# Patient Record
Sex: MALE | Race: WHITE | Employment: OTHER | URBAN - METROPOLITAN AREA
[De-identification: names, ages, dates, MRNs, and addresses within clinical notes are randomized per-mention and may not be internally consistent; named-entity substitution may affect disease eponyms.]

---

## 2017-04-05 ENCOUNTER — TELEPHONE (OUTPATIENT)
Dept: CARDIOLOGY | Facility: CLINIC | Age: 62
End: 2017-04-05

## 2017-04-05 NOTE — TELEPHONE ENCOUNTER
Writer called pt daughter Tyra to inquire more information prior to OV with Dr. Flanagan. Pt daughter states that pt is not short of breath or having any chest pain at this time. Pt daughter states that pt becomes dizzy, pale, diaphoretic, and experiences palpitations that last 20-30 minutes and these symptoms occur about once per day. Pt daughter states that these symptoms have been occurring daily for the past week and pt had to leave work early today.  Pt daughter states that these symptoms used to occur about once per month. Pt daughter states that pt has no been seen by a cardiologist since living in [place] for the past 12 years. 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 Pt daughter also stated that pt possibly had a cardioversion done in [place]. Pt daughter was unsure of procedure and pt daughter stated that a pacemaker was recommended, but pt did not get it. Writer recommended that pt go to the ED now prior to appt with Dr. Flanagan. Pt daughter states that pt is stubborn and will not go to the ED at this time. Writer reinforced the recommendation of going to the ED. Pt daughter verbalized understanding, but states pt will probably not agree to go to ED. Pt daughter states that she was receiving a call from her mother and that she would call writer back with more information. Writer will await pt daughter return phone call.

## 2017-04-05 NOTE — TELEPHONE ENCOUNTER
Pt's daughter, Tyra, called requesting an appt for her father. 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 She states that he has not seen a cardiologist since moving to  [place] twelve years ago. He does not have a PMD. He has recently been experiencing  Some dizzy spells, during which time he has to sit down & then after a few minutes he is fine. Daughter will contact her mother to see is she has kept any records from [place] to bring to the appt. She does not remember the name of the MD or clinic for us to retrieve recs. Pt was scheduled to see Dr Flanagan tomorrow, 4/6/17.  Mihaela

## 2017-04-05 NOTE — TELEPHONE ENCOUNTER
Pt daughter called back and stated that pt was seen at [facility] for an ablation. Pt daughter states that she is going to bring the records with to the patients appt on 4/6/17. Writer will request records.

## 2017-04-06 ENCOUNTER — OFFICE VISIT (OUTPATIENT)
Dept: CARDIOLOGY | Facility: CLINIC | Age: 62
End: 2017-04-06
Payer: COMMERCIAL

## 2017-04-06 VITALS
SYSTOLIC BLOOD PRESSURE: 138 MMHG | HEIGHT: 72 IN | WEIGHT: 196 LBS | DIASTOLIC BLOOD PRESSURE: 85 MMHG | HEART RATE: 80 BPM | BODY MASS INDEX: 26.55 KG/M2

## 2017-04-06 DIAGNOSIS — Z98.890 S/P ABLATION OF ACCESSORY BYPASS TRACT: ICD-10-CM

## 2017-04-06 DIAGNOSIS — I45.6 ANOMALOUS ATRIOVENTRICULAR EXCITATION: ICD-10-CM

## 2017-04-06 DIAGNOSIS — R00.2 PALPITATIONS: Primary | ICD-10-CM

## 2017-04-06 PROCEDURE — 93000 ELECTROCARDIOGRAM COMPLETE: CPT | Performed by: INTERNAL MEDICINE

## 2017-04-06 PROCEDURE — 99204 OFFICE O/P NEW MOD 45 MIN: CPT | Mod: 25 | Performed by: INTERNAL MEDICINE

## 2017-04-06 NOTE — PROGRESS NOTES
HPI and Plan:   See dictation:[id number]    Orders Placed This Encounter   Procedures     EKG 12-lead complete w/read - Clinics (performed today)     EKG 12-lead complete w/read - Clinics (performed today)     Holter Monitor 48 hour - Adult       No orders of the defined types were placed in this encounter.      There are no discontinued medications.      Encounter Diagnosis   Name Primary?     Palpitations Yes       CURRENT MEDICATIONS:  No current outpatient prescriptions on file.       ALLERGIES   No Known Allergies    PAST MEDICAL HISTORY:  History reviewed. No pertinent past medical history.    PAST SURGICAL HISTORY:  History reviewed. No pertinent surgical history.    FAMILY HISTORY:  Family History   Problem Relation Age of Onset     CANCER Mother      Angina Father        SOCIAL HISTORY:  Social History     Social History     Marital status:      Spouse name: N/A     Number of children: N/A     Years of education: N/A     Social History Main Topics     Smoking status: Never Smoker     Smokeless tobacco: None     Alcohol use None     Drug use: No     Sexual activity: Not Asked     Other Topics Concern     Parent/Sibling W/ Cabg, Mi Or Angioplasty Before 65f 55m? No     Social History Narrative     None       Review of Systems:  Skin:  Negative       Eyes:  Negative      ENT:  Negative      Respiratory:  Negative       Cardiovascular:    Positive for;palpitations;lightheadedness    Gastroenterology: Negative      Genitourinary:  not assessed      Musculoskeletal:  Negative      Neurologic:  Positive for headaches;local weakness    Psychiatric:  Negative      Heme/Lymph/Imm:  Positive for allergies    Endocrine:  Negative        Physical Exam:  Vitals: /85 (BP Location: Right arm, Cuff Size: Adult Large)  Pulse 80  Ht 1.829 m (6')  Wt 88.9 kg (196 lb)  BMI 26.58 kg/m2    Constitutional:  cooperative, alert and oriented, well developed, well nourished, in no acute distress        Skin:  warm and dry  to the touch, no apparent skin lesions or masses noted        Head:  normocephalic, no masses or lesions        Eyes:  pupils equal and round, conjunctivae and lids unremarkable, sclera white, no xanthalasma, EOMS intact, no nystagmus        ENT:  no pallor or cyanosis, dentition good        Neck:  carotid pulses are full and equal bilaterally, JVP normal, no carotid bruit, no thyromegaly        Chest:  normal breath sounds, clear to auscultation, normal A-P diameter, normal symmetry, normal respiratory excursion, no use of accessory muscles          Cardiac: regular rhythm, normal S1/S2, no S3 or S4, apical impulse not displaced, no murmurs, gallops or rubs             variably split S1    Abdomen:  abdomen soft, non-tender, BS normoactive, no mass, no HSM, no bruits        Vascular: pulses full and equal, no bruits auscultated                                        Extremities and Back:  no deformities, clubbing, cyanosis, erythema observed;no edema              Neurological:  affect appropriate, oriented to time, person and place;no gross motor deficits              CC  No referring provider defined for this encounter.

## 2017-04-06 NOTE — MR AVS SNAPSHOT
"              After Visit Summary   4/6/2017    Robert Felix    MRN: [id number]           Patient Information     Date Of Birth          1955        Visit Information        Provider Department      4/6/2017 2:45 PM Pascual Flanagan MD [facility] [place]        Today's Diagnoses     Palpitations    -  1       Follow-ups after your visit        Future tests that were ordered for you today     Open Future Orders        Priority Expected Expires Ordered    Holter Monitor 48 hour - Adult Routine 4/13/2017 4/6/2018 4/6/2017            Who to contact     If you have questions or need follow up information about today's clinic visit or your schedule please contact [facility] directly at [phone].  Normal or non-critical lab and imaging results will be communicated to you by MyChart, letter or phone within 4 business days after the clinic has received the results. If you do not hear from us within 7 days, please contact the clinic through MyChart or phone. If you have a critical or abnormal lab result, we will notify you by phone as soon as possible.  Submit refill requests through [employer] or call your pharmacy and they will forward the refill request to us. Please allow 3 business days for your refill to be completed.          Additional Information About Your Visit        My[employer]Point[employer] Information     [employer] lets you send messages to your doctor, view your test results, renew your prescriptions, schedule appointments and more. To sign up, go to [email or web address] . Click on \"Log in\" on the left side of the screen, which will take you to the Welcome page. Then click on \"Sign up Now\" on the right side of the page.     You will be asked to enter the access code listed below, as well as some personal information. Please follow the directions to create your username and password.     Your access code is: [email or web address]  Expires: " 2017  3:31 PM     Your access code will  in 90 days. If you need help or a new code, please call your [facility] clinic or [phone].        Care EveryWhere ID     This is your Care EveryWhere ID. This could be used by other organizations to access your [facility] medical records  [id number]        Your Vitals Were     Pulse Height BMI (Body Mass Index)             80 1.829 m (6') 26.58 kg/m2          Blood Pressure from Last 3 Encounters:   17 138/85    Weight from Last 3 Encounters:   17 88.9 kg (196 lb)              We Performed the Following     EKG 12-lead complete w/read - Clinics (performed today)     EKG 12-lead complete w/read - Clinics (performed today)        Primary Care Provider    None Specified       No primary provider on file.        Thank you!     Thank you for choosing [facility] [place]  for your care. Our goal is always to provide you with excellent care. Hearing back from our patients is one way we can continue to improve our services. Please take a few minutes to complete the written survey that you may receive in the mail after your visit with us. Thank you!             Your Updated Medication List - Protect others around you: Learn how to safely use, store and throw away your medicines at www.disposemymeds.org.      Notice  As of 2017  3:31 PM    You have not been prescribed any medications.

## 2017-04-07 ENCOUNTER — HOSPITAL ENCOUNTER (OUTPATIENT)
Dept: CARDIOLOGY | Facility: CLINIC | Age: 62
Discharge: HOME | End: 2017-04-07
Attending: INTERNAL MEDICINE | Admitting: INTERNAL MEDICINE
Payer: COMMERCIAL

## 2017-04-07 DIAGNOSIS — R00.2 PALPITATIONS: ICD-10-CM

## 2017-04-07 PROCEDURE — 93227 XTRNL ECG REC<48 HR R&I: CPT | Performed by: INTERNAL MEDICINE

## 2017-04-07 PROCEDURE — 93226 XTRNL ECG REC<48 HR SCAN A/R: CPT

## 2017-04-07 NOTE — PROGRESS NOTES
HISTORY OF PRESENT ILLNESS:  I had the pleasure of seeing Mr. Robert Felix at [facility] for evaluation of palpitations.  This patient previously lived in [place] and was diagnosed with Brian-Parkinson-White syndrome.  He underwent ablation for that condition in 1992.  Before that, his arrhythmias would cause shortness of breath and diaphoresis with tachycardia.  Over the last 4-5 years he has had intermittent palpitations where his heart may feel dull and irregular.  Oftentimes it is not fast.  When he has his palpitations he feels weak.  His speech feels slower.  This may last 20 minutes to 3 hours.  While living in [place], he felt that the palpitations were more frequent and was due to the hot weather.  He has since moved to [place].  Over the last year he has had intermittent palpitations possibly once to twice per week as of last summer.  Now over the last several months it has become more frequent and in the last week it is daily.  It seems to be exacerbated postprandially.  He feels as though his rhythm is not steady but not fast.  He denies syncope, presyncope or lightheadedness.  He does feel a pressure in his chest and head when this occurs.  He has no history of coronary artery disease and denies any exertional angina.  He denies significant shortness of breath with his symptoms.  He denies a history of myocardial infarction or hypertension.  He believes while living in [facility] in 1982 that an EKG was read as possibly having a previous myocardial infarction.  His family history includes father with chest pain that was never evaluated.  The patient is originally from the [facility] and currently is employed doing electrical assembly.  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  The patient denies nausea or vomiting.  He does not exercise routinely but stays very busy.  He is  and his daughter is in attendance today doing some  translation.  He is a nonsmoker.  He does not drink alcohol for U[place] reasons.      ALLERGIES:  None known about.      PAST MEDICAL HISTORY:   1.  Status post inguinal herniorrhaphy in 1992.     2.  Brian-Parkinson-White disease with previous ablation in 1992.   3.  No history of peptic ulcer disease, cancer, tuberculosis, kidney stones or disease, hyper or hypothyroidism.      REVIEW OF SYSTEMS:  A 12-point review of system is performed with pertinent positives and negatives as listed in the HPI.  All other review of systems are asked and are negative.      PHYSICAL EXAMINATION:   VITAL SIGNS:  Current blood pressure is 138/85, pulse is 80 and regular, weight is 196 pounds.   HEENT:  Benign without xanthelasma.   NECK:  Supple without thyromegaly.  Carotid upstroke is brisk without bruits.   CHEST:  Clear to auscultation without wheezes, rales or rhonchi.   CARDIAC:  Regular rate and rhythm.  S1 is variably split based on respiration, normal S2 without S3 or S4 gallop.  No murmur.  No heave, rub or thrill.  No JVD or HJR.  Pulses are all intact without bruits.  No tenderness to palpation across the precordium.   ABDOMEN:  Soft, nontender without organomegaly.   EXTREMITIES:  Without cyanosis, clubbing or edema.      EKG demonstrates normal sinus rhythm at 84 beats per minute.  No significant arrhythmia.  This is a normal EKG.  No delta waves or decrease in the MD interval.      ASSESSMENT:   1.  Robert Felix is a pleasant 61-year-old male originally from the [facility] who presents for evaluation of palpitations.  Etiology is unclear.  I have sketched a picture of the electrical system in the heart including atrial fibrillation and atrial flutter.  We also talked about ventricular tachyarrhythmias.  He is unlikely to have ventricular arrhythmias based on normal EKG and no history of cardiomyopathy or infarction.  On the other hand his symptoms are occurring every day and may represent tachy or  bradyarrhythmias.  It would be prudent for us to evaluate this with a 48-hour Holter monitor.  The rhythms are happening frequently enough where I believe we will identify any arrhythmia during this timeframe.  Further evaluation will depend on the results of that test.  Perhaps an echocardiogram and thyroid function tests are in order.  We will wait until we have these results.   2.  History of Brian-Parkinson-White syndrome with previous ablation.  His palpitations and possible arrhythmia likely have nothing to do with this.  The patient himself notes a different feeling than he had previously with his tachycardias in .  The patient has numerous episodes of his palpitations during the day including frequently postprandially.  This can last up to 3 hours.  Further evaluation as above.      It is my pleasure to assist in the care of Robert Felix.  All his questions were answered to his satisfaction.  Followup appointment will be made based on the results of his monitor.      MD HUGH Deal MD, [facility]             D: 2017 15:47   T: 2017 07:17   MT: KAMALA      Name:     ROBERT FELIX   MRN:      [id number]        Account:      [id number]   :      1955           Service Date: 2017      Document: [id number]

## 2017-04-12 ENCOUNTER — TELEPHONE (OUTPATIENT)
Dept: CARDIOLOGY | Facility: CLINIC | Age: 62
End: 2017-04-12

## 2017-04-12 NOTE — TELEPHONE ENCOUNTER
Received a vm from pt daughter stating that pt wore a holter over the weekend and pt daughter was wondering when the results would be available.     Writer called pt daughter back and stated that Dr. Flanagan has not reviewed the results yet at this time. Pt daughter was wondering if a follow up appt needed to be made. Writer informed pt daughter that once we have Dr. Flanagan response and recommendations pt daughter will be contacted. Pt daughter verbalized understanding and has no further questions or concerns at this time.    Chart reviewed: OV 4/6/17 Dr. Flanagan    ASSESSMENT:   1. Robert Felix is a pleasant 61-year-old male originally from the [facility] who presents for evaluation of palpitations. Etiology is unclear. I have sketched a picture of the electrical system in the heart including atrial fibrillation and atrial flutter. We also talked about ventricular tachyarrhythmias. He is unlikely to have ventricular arrhythmias based on normal EKG and no history of cardiomyopathy or infarction. On the other hand his symptoms are occurring every day and may represent tachy or bradyarrhythmias. It would be prudent for us to evaluate this with a 48-hour Holter monitor. The rhythms are happening frequently enough where I believe we will identify any arrhythmia during this timeframe. Further evaluation will depend on the results of that test. Perhaps an echocardiogram and thyroid function tests are in order. We will wait until we have these results.   2. History of Brian-Parkinson-White syndrome with previous ablation. His palpitations and possible arrhythmia likely have nothing to do with this. The patient himself notes a different feeling than he had previously with his tachycardias in 1992. The patient has numerous episodes of his palpitations during the day including frequently postprandially. This can last up to 3 hours. Further evaluation as above.       It is my pleasure to assist in the care of Robert  Elvira. All his questions were answered to his satisfaction. Followup appointment will be made based on the results of his monitor.     Writer will route to Dr. Flanagan for holter monitor recommendations.

## 2017-04-13 ENCOUNTER — TELEPHONE (OUTPATIENT)
Dept: CARDIOLOGY | Facility: CLINIC | Age: 62
End: 2017-04-13

## 2017-04-13 DIAGNOSIS — R00.2 PALPITATIONS: Primary | ICD-10-CM

## 2017-04-13 NOTE — TELEPHONE ENCOUNTER
The Holter Monitor shows paroxysmal atrial fibrillation.  We need a TSH and stress echo (to assess for cardiac ischemia and look at chamber sizes and valves).  After this is completed, I would like to see this patient in our office with an office visit to discuss treatment options and decide if anticoagulation is desired.  I would wait to start beta blockers or other medications until the stress echo is completed.  Pascual Flanagan MD, [facility]   April 12, 2017 8:31 PM     Writer placed order for stress ECHO, TSH, and follow up with Dr. Flanagan    Writer called pt daughter back with results as she called on 4/12/17 asking for results. Pt daughter stated that writer should speak with her about results. Pt daughter stated that if writer called pt to discuss pt daughter would need to translate. Writer told pt daughter that pt is currently in A-fib Writer told pt daughter that Dr. Flanagan advised that pt get a TSH and stress ECHO and Dr. Flanagan would like to see pt back in the office. Pt daughter verbalized understanding. No further questions or concerns at this time. Writer tx pt daughter to scheduling.

## 2017-04-17 ENCOUNTER — TRANSFERRED RECORDS (OUTPATIENT)
Dept: HEALTH INFORMATION MANAGEMENT | Facility: CLINIC | Age: 62
End: 2017-04-17

## 2017-04-20 ENCOUNTER — HOSPITAL ENCOUNTER (OUTPATIENT)
Dept: CARDIOLOGY | Facility: CLINIC | Age: 62
Discharge: HOME | End: 2017-04-20
Attending: INTERNAL MEDICINE | Admitting: INTERNAL MEDICINE
Payer: COMMERCIAL

## 2017-04-20 DIAGNOSIS — R00.2 PALPITATIONS: ICD-10-CM

## 2017-04-20 LAB — TSH SERPL DL<=0.05 MIU/L-ACNC: 1.59 MU/L (ref 0.4–4)

## 2017-04-20 PROCEDURE — 84443 ASSAY THYROID STIM HORMONE: CPT | Performed by: INTERNAL MEDICINE

## 2017-04-20 PROCEDURE — 93350 STRESS TTE ONLY: CPT | Mod: 26 | Performed by: INTERNAL MEDICINE

## 2017-04-20 PROCEDURE — 36415 COLL VENOUS BLD VENIPUNCTURE: CPT | Performed by: INTERNAL MEDICINE

## 2017-04-20 PROCEDURE — 93325 DOPPLER ECHO COLOR FLOW MAPG: CPT | Mod: 26 | Performed by: INTERNAL MEDICINE

## 2017-04-20 PROCEDURE — 93321 DOPPLER ECHO F-UP/LMTD STD: CPT | Mod: 26 | Performed by: INTERNAL MEDICINE

## 2017-04-20 PROCEDURE — 93018 CV STRESS TEST I&R ONLY: CPT | Performed by: INTERNAL MEDICINE

## 2017-04-20 PROCEDURE — 93016 CV STRESS TEST SUPVJ ONLY: CPT | Performed by: INTERNAL MEDICINE

## 2017-04-20 PROCEDURE — 93325 DOPPLER ECHO COLOR FLOW MAPG: CPT | Mod: TC

## 2017-04-24 ENCOUNTER — TELEPHONE (OUTPATIENT)
Dept: CARDIOLOGY | Facility: CLINIC | Age: 62
End: 2017-04-24

## 2017-04-24 DIAGNOSIS — I48.0 PAROXYSMAL ATRIAL FIBRILLATION (H): Primary | ICD-10-CM

## 2017-04-24 RX ORDER — METOPROLOL TARTRATE 25 MG/1
25 TABLET, FILM COATED ORAL 2 TIMES DAILY
Qty: 180 TABLET | Refills: 3 | Status: SHIPPED | OUTPATIENT
Start: 2017-04-24 | End: 2017-12-18

## 2017-04-24 NOTE — TELEPHONE ENCOUNTER
Spoke to patient's daughter. Results given.  Metoprolol 25 mg BID ordered to pharmacy of choice. May 18 office visit with Dr. Flanagan was rescheduled to May 1 to discuss flecainide/sotalol and anticoagulation. Team 4 number given for futher concerns.

## 2017-04-24 NOTE — TELEPHONE ENCOUNTER
Called patient to give him the results of his echo stress test and TSH:    Patient with normal stress test.  Bicuspid aortic valve with mild aortic enlargement.  His family should be evaluated for the same diagnosis.  No aortic stenosis.  He was found to have paroxysmal atrial fibrillation on his Holter.  I would start him on metoprolol 25 mg bid for now with antiarrhythmics like flecainide or sotalol also possibilities.  I would like to meet with him to discuss this more fully and decide regarding anticoagulation.  CHADS-Vasc score is 0.  Pascual Flanagan MD, [facility]   April 20, 2017 11:00 PM     Normal TSH.  Pascual Flanagan MD, FACC     Patient stated he understood but he just had a tooth extracted and is currently on pain medications.  He would like these results and medications to be discussed with his daughter Tyra.     Tyra called. She did not answer. Left a message to call team 4 with the direct number.

## 2017-05-01 ENCOUNTER — OFFICE VISIT (OUTPATIENT)
Dept: CARDIOLOGY | Facility: CLINIC | Age: 62
End: 2017-05-01
Payer: COMMERCIAL

## 2017-05-01 VITALS
DIASTOLIC BLOOD PRESSURE: 75 MMHG | HEART RATE: 60 BPM | SYSTOLIC BLOOD PRESSURE: 130 MMHG | BODY MASS INDEX: 26.82 KG/M2 | WEIGHT: 198 LBS | HEIGHT: 72 IN

## 2017-05-01 DIAGNOSIS — R00.2 PALPITATIONS: ICD-10-CM

## 2017-05-01 DIAGNOSIS — I35.9 AORTIC VALVE DISORDER: ICD-10-CM

## 2017-05-01 DIAGNOSIS — I48.0 PAROXYSMAL ATRIAL FIBRILLATION (H): Primary | ICD-10-CM

## 2017-05-01 DIAGNOSIS — Z98.890 S/P ABLATION OF ACCESSORY BYPASS TRACT: ICD-10-CM

## 2017-05-01 DIAGNOSIS — Q25.43 ANEURYSM OF AORTIC ROOT: ICD-10-CM

## 2017-05-01 PROCEDURE — 99214 OFFICE O/P EST MOD 30 MIN: CPT | Performed by: INTERNAL MEDICINE

## 2017-05-01 NOTE — LETTER
5/1/2017    Pete Pham MD  [facility]   [address]  [facility] [address]    RE: Robert Felix       Dear Colleague,    I again had the pleasure of seeing your patient, Robert Felix, at [facility] for evaluation of palpitations.  The patient has noted a previous diagnosis of Brian-Parkinson-White syndrome with ablation in 1992.  The patient states that prior to the year 2000 he was having palpitations with a regular supraventricular tachycardia.  After the year 2000 he felt that his arrhythmias would be quite irregular and quite fast.  I saw him in clinic on 04/06/2017.  On 04/11, a 48-hour Holter monitor was performed indicating paroxysmal atrial fibrillation with occasional premature ventricular contractions and occasional short bursts of supraventricular tachycardia.  Additionally a stress echocardiogram was performed on 04/20/2017 with the patient able to exercise for 7 minutes free of angina.  There were no areas of ischemia or infarction.  His resting blood pressure was 128/80 with a peak blood pressure of 180/70.  Analysis of his valves from his stress echo showed a bicuspid aortic valve with a raphe between the right and left coronary cusps.  There was trace aortic regurgitation and no hemodynamically significant valvular aortic stenosis.  There was mild aortic root dilatation at 4 cm but normal ascending aorta.  RVSP was 23 mmHg plus right atrial pressure.      The patient states that in the last 2 weeks since Easter he has reduced his intake of chocolate and caffeine.  He has had only 1 episode of palpitations lasting 6 seconds.  He has otherwise felt well.  He continues to be quite active.  He denies any heavy weightlifting.      PHYSICAL EXAMINATION:   VITAL SIGNS:  Current blood pressure is 130/75, pulse is 60 and regular, weight is 198 pounds, BMI is 27.   CHEST:  Clear to auscultation.   CARDIAC:  Regular rate and rhythm, normal  S1 and S2 without S3 or S4 gallop.  No murmur.  No heave, rub or thrill.  No JVD or HJR.  Pulses are all intact without bruits.  No tenderness to palpation across the precordium.   ABDOMEN:  Soft, nontender without organomegaly.   EXTREMITIES:  Without cyanosis, clubbing or edema.     Outpatient Encounter Prescriptions as of 5/1/2017   Medication Sig Dispense Refill     metoprolol (LOPRESSOR) 25 MG tablet Take 1 tablet (25 mg) by mouth 2 times daily (Patient taking differently: Take 25 mg by mouth 2 times daily Has not started yet.) 180 tablet 3     No facility-administered encounter medications on file as of 5/1/2017.       ASSESSMENT:   1.  Robert Felix is a pleasant 61-year-old male originally from the [facility] who presents for evaluation of palpitations.  The patient has some supraventricular arrhythmias including paroxysmal atrial fibrillation.  These occur sporadically.  Initially we thought placing him on low dose beta blocker would be helpful but his heart rate at rest can be as low as 60 beats per minute.  This does not allow much room for the addition of a beta blocker.  If this patient has frequent episodes of SVT including atrial fibrillation antiarrhythmic medications such as flecainide might be preferred.  This has been explained to the patient at length.  For now he is happy with the infrequent rhythms that he is having.  He would like to avoid medications if possible.  We will see him again in 6 months to again discuss his arrhythmias.   2.  History of Brian-Parkinson-White syndrome with previous ablation.  Atrial tachyarrhythmias would be a bigger problem had he not had a WPW ablation.  For now we can continue to monitor since there is no obvious indication that WPW still exists in this patient.   3.  The patient has evidence of a bicuspid aortic valve and mild aortic root enlargement.  We have discussed this at length including the genetic risk to his family.  I have suggested that children  and siblings should be evaluated for bicuspid aortic valve or aortic aneurysms.  For now annual echocardiographic surveillance would seem prudent.  The patient is agreeable and we will recheck an echocardiogram in 1 year.  No SBE prophylaxis is necessary.  I have asked the patient to continue with his normal life but avoid prolonged heavy lifting.      It is my pleasure to assist in the care of Robert Felix.  His daughter was in attendance and did translation today.  All the patient's questions were answered to his satisfaction.      Thirty-five minutes were spent with the patient and his daughter, greater than 50 percent of that was counseling.     Sincerely,    Pascual Flanagan MD     [facility]

## 2017-05-01 NOTE — MR AVS SNAPSHOT
"              After Visit Summary   5/1/2017    Robert Felix    MRN: [id number]           Patient Information     Date Of Birth          1955        Visit Information        Provider Department      5/1/2017 3:45 PM Pascual Flanagan MD [facility] [place]        Today's Diagnoses     Paroxysmal atrial fibrillation (H)    -  1    Aortic valve disorder        Palpitations           Follow-ups after your visit        Additional Services     Follow-Up with Cardiologist                 Future tests that were ordered for you today     Open Future Orders        Priority Expected Expires Ordered    Echocardiogram Routine 5/1/2018 6/5/2018 5/1/2017    Follow-Up with Cardiologist Routine 11/1/2017 9/13/2018 5/1/2017            Who to contact     If you have questions or need follow up information about today's clinic visit or your schedule please contact [facility] [place] directly at [phone].  Normal or non-critical lab and imaging results will be communicated to you by MyChart, letter or phone within 4 business days after the clinic has received the results. If you do not hear from us within 7 days, please contact the clinic through TransitScreen[employer] or phone. If you have a critical or abnormal lab result, we will notify you by phone as soon as possible.  Submit refill requests through [employer] or call your pharmacy and they will forward the refill request to us. Please allow 3 business days for your refill to be completed.          Additional Information About Your Visit        MyChart Information     [employer] lets you send messages to your doctor, view your test results, renew your prescriptions, schedule appointments and more. To sign up, go to [email or web address] . Click on \"Log in\" on the left side of the screen, which will take you to the Welcome page. Then click on \"Sign up Now\" on the right side of the page.     You will be asked to enter " the access code listed below, as well as some personal information. Please follow the directions to create your username and password.     Your access code is: [email or web address]  Expires: 2017  3:31 PM     Your access code will  in 90 days. If you need help or a new code, please call your [place] clinic or [phone].        Care EveryWhere ID     This is your Care EveryWhere ID. This could be used by other organizations to access your [place] medical records  [id number]        Your Vitals Were     Pulse Height BMI (Body Mass Index)             60 1.829 m (6') 26.85 kg/m2          Blood Pressure from Last 3 Encounters:   17 130/75   17 138/85    Weight from Last 3 Encounters:   17 89.8 kg (198 lb)   17 88.9 kg (196 lb)              We Performed the Following     Follow-Up with Cardiologist          Today's Medication Changes          These changes are accurate as of: 17  4:37 PM.  If you have any questions, ask your nurse or doctor.               These medicines have changed or have updated prescriptions.        Dose/Directions    metoprolol 25 MG tablet   Commonly known as:  LOPRESSOR   This may have changed:  additional instructions   Used for:  Paroxysmal atrial fibrillation (H)        Dose:  25 mg   Take 1 tablet (25 mg) by mouth 2 times daily   Quantity:  180 tablet   Refills:  3                Primary Care Provider Office Phone # Fax #    Pete Pham [phone]       [facility] [address]  [facility] [address]        Thank you!     Thank you for choosing [facility] [place]  for your care. Our goal is always to provide you with excellent care. Hearing back from our patients is one way we can continue to improve our services. Please take a few minutes to complete the written survey that you may receive in the mail after your visit with us. Thank you!             Your Updated Medication  List - Protect others around you: Learn how to safely use, store and throw away your medicines at www.disposemymeds.org.          This list is accurate as of: 5/1/17  4:37 PM.  Always use your most recent med list.                   Brand Name Dispense Instructions for use    metoprolol 25 MG tablet    LOPRESSOR    180 tablet    Take 1 tablet (25 mg) by mouth 2 times daily

## 2017-05-01 NOTE — PROGRESS NOTES
HPI and Plan:   See dictation:[id number]    Orders Placed This Encounter   Procedures     Follow-Up with Cardiologist     Echocardiogram       No orders of the defined types were placed in this encounter.      There are no discontinued medications.      Encounter Diagnoses   Name Primary?     Paroxysmal atrial fibrillation (H) Yes     Aortic valve disorder      Palpitations        CURRENT MEDICATIONS:  Current Outpatient Prescriptions   Medication Sig Dispense Refill     metoprolol (LOPRESSOR) 25 MG tablet Take 1 tablet (25 mg) by mouth 2 times daily (Patient taking differently: Take 25 mg by mouth 2 times daily Has not started yet.) 180 tablet 3       ALLERGIES   No Known Allergies    PAST MEDICAL HISTORY:  History reviewed. No pertinent past medical history.    PAST SURGICAL HISTORY:  History reviewed. No pertinent surgical history.    FAMILY HISTORY:  Family History   Problem Relation Age of Onset     CANCER Mother      Angina Father        SOCIAL HISTORY:  Social History     Social History     Marital status:      Spouse name: N/A     Number of children: N/A     Years of education: N/A     Social History Main Topics     Smoking status: Never Smoker     Smokeless tobacco: None     Alcohol use None     Drug use: No     Sexual activity: Not Asked     Other Topics Concern     Parent/Sibling W/ Cabg, Mi Or Angioplasty Before 65f 55m? No     Social History Narrative       Review of Systems:  Skin:  Negative       Eyes:  Negative      ENT:  Negative      Respiratory:  Negative       Cardiovascular:  Negative      Gastroenterology: Negative      Genitourinary:  not assessed      Musculoskeletal:  Negative      Neurologic:  Negative      Psychiatric:  Negative      Heme/Lymph/Imm:  Positive for allergies    Endocrine:  Negative        Physical Exam:  Vitals: /75 (BP Location: Left arm, Cuff Size: Adult Large)  Pulse 60  Ht 1.829 m (6')  Wt 89.8 kg (198 lb)  BMI 26.85 kg/m2    Constitutional:  cooperative,  alert and oriented, well developed, well nourished, in no acute distress        Skin:  warm and dry to the touch, no apparent skin lesions or masses noted        Head:  normocephalic, no masses or lesions        Eyes:  pupils equal and round, conjunctivae and lids unremarkable, sclera white, no xanthalasma, EOMS intact, no nystagmus        ENT:  no pallor or cyanosis, dentition good        Neck:  carotid pulses are full and equal bilaterally, JVP normal, no carotid bruit, no thyromegaly        Chest:  normal breath sounds, clear to auscultation, normal A-P diameter, normal symmetry, normal respiratory excursion, no use of accessory muscles          Cardiac: regular rhythm, normal S1/S2, no S3 or S4, apical impulse not displaced, no murmurs, gallops or rubs             variably split S1    Abdomen:  abdomen soft, non-tender, BS normoactive, no mass, no HSM, no bruits        Vascular: pulses full and equal, no bruits auscultated                                        Extremities and Back:  no deformities, clubbing, cyanosis, erythema observed;no edema              Neurological:  affect appropriate, oriented to time, person and place;no gross motor deficits              CC  No referring provider defined for this encounter.

## 2017-05-02 NOTE — PROGRESS NOTES
HISTORY OF PRESENT ILLNESS:  I again had the pleasure of seeing your patient, Robert Felix, at [facility] for evaluation of palpitations.  The patient has noted a previous diagnosis of Brian-Parkinson-White syndrome with ablation in 1992.  The patient states that prior to the year 2000 he was having palpitations with a regular supraventricular tachycardia.  After the year 2000 he felt that his arrhythmias would be quite irregular and quite fast.  I saw him in clinic on 04/06/2017.  On 04/11, a 48-hour Holter monitor was performed indicating paroxysmal atrial fibrillation with occasional premature ventricular contractions and occasional short bursts of supraventricular tachycardia.  Additionally a stress echocardiogram was performed on 04/20/2017 with the patient able to exercise for 7 minutes free of angina.  There were no areas of ischemia or infarction.  His resting blood pressure was 128/80 with a peak blood pressure of 180/70.  Analysis of his valves from his stress echo showed a bicuspid aortic valve with a raphe between the right and left coronary cusps.  There was trace aortic regurgitation and no hemodynamically significant valvular aortic stenosis.  There was mild aortic root dilatation at 4 cm but normal ascending aorta.  RVSP was 23 mmHg plus right atrial pressure.      The patient states that in the last 2 weeks since Easter he has reduced his intake of chocolate and caffeine.  He has had only 1 episode of palpitations lasting 6 seconds.  He has otherwise felt well.  He continues to be quite active.  He denies any heavy weightlifting.      PHYSICAL EXAMINATION:   VITAL SIGNS:  Current blood pressure is 130/75, pulse is 60 and regular, weight is 198 pounds, BMI is 27.   CHEST:  Clear to auscultation.   CARDIAC:  Regular rate and rhythm, normal S1 and S2 without S3 or S4 gallop.  No murmur.  No heave, rub or thrill.  No JVD or HJR.  Pulses are all intact without bruits.  No  tenderness to palpation across the precordium.   ABDOMEN:  Soft, nontender without organomegaly.   EXTREMITIES:  Without cyanosis, clubbing or edema.      ASSESSMENT:   1.  Robert Felix is a pleasant 61-year-old male originally from the [facility] who presents for evaluation of palpitations.  The patient has some supraventricular arrhythmias including paroxysmal atrial fibrillation.  These occur sporadically.  Initially we thought placing him on low dose beta blocker would be helpful but his heart rate at rest can be as low as 60 beats per minute.  This does not allow much room for the addition of a beta blocker.  If this patient has frequent episodes of SVT including atrial fibrillation antiarrhythmic medications such as flecainide might be preferred.  This has been explained to the patient at length.  For now he is happy with the infrequent rhythms that he is having.  He would like to avoid medications if possible.  We will see him again in 6 months to again discuss his arrhythmias.   2.  History of Brian-Parkinson-White syndrome with previous ablation.  Atrial tachyarrhythmias would be a bigger problem had he not had a WPW ablation.  For now we can continue to monitor since there is no obvious indication that WPW still exists in this patient.   3.  The patient has evidence of a bicuspid aortic valve and mild aortic root enlargement.  We have discussed this at length including the genetic risk to his family.  I have suggested that children and siblings should be evaluated for bicuspid aortic valve or aortic aneurysms.  For now annual echocardiographic surveillance would seem prudent.  The patient is agreeable and we will recheck an echocardiogram in 1 year.  No SBE prophylaxis is necessary.  I have asked the patient to continue with his normal life but avoid prolonged heavy lifting.      It is my pleasure to assist in the care of Robert Felix.  His daughter was in attendance and did translation today.   All the patient's questions were answered to his satisfaction.      Thirty-five minutes were spent with the patient and his daughter, greater than 50 percent of that was counseling.      Pascual Flanagan MD      cc:   Pete Pham [facility]   [address]   [place] [address]         PASCUAL FLANAGAN MD, [facility]             D: 2017 17:24   T: 2017 07:16   MT: VD      Name:     SVEN MESA   MRN:      [id number]        Account:      [id number]   :      1955           Service Date: 2017      Document: [id number]

## 2017-12-06 PROBLEM — Z86.79 HISTORY OF WOLFF-PARKINSON-WHITE SYNDROME: Status: ACTIVE | Noted: 2017-12-06

## 2017-12-18 ENCOUNTER — OFFICE VISIT (OUTPATIENT)
Dept: CARDIOLOGY | Facility: CLINIC | Age: 62
End: 2017-12-18
Attending: INTERNAL MEDICINE
Payer: COMMERCIAL

## 2017-12-18 VITALS
HEART RATE: 72 BPM | WEIGHT: 192 LBS | SYSTOLIC BLOOD PRESSURE: 132 MMHG | HEIGHT: 72 IN | BODY MASS INDEX: 26.01 KG/M2 | DIASTOLIC BLOOD PRESSURE: 82 MMHG

## 2017-12-18 DIAGNOSIS — Q25.43 ANEURYSM OF AORTIC ROOT: ICD-10-CM

## 2017-12-18 DIAGNOSIS — I45.6 ANOMALOUS ATRIOVENTRICULAR EXCITATION: ICD-10-CM

## 2017-12-18 DIAGNOSIS — I48.0 PAROXYSMAL ATRIAL FIBRILLATION (H): ICD-10-CM

## 2017-12-18 DIAGNOSIS — Z98.890 S/P ABLATION OF ACCESSORY BYPASS TRACT: ICD-10-CM

## 2017-12-18 DIAGNOSIS — R00.2 PALPITATIONS: Primary | ICD-10-CM

## 2017-12-18 DIAGNOSIS — I35.9 AORTIC VALVE DISORDER: ICD-10-CM

## 2017-12-18 PROCEDURE — 99214 OFFICE O/P EST MOD 30 MIN: CPT | Performed by: INTERNAL MEDICINE

## 2017-12-18 RX ORDER — FLECAINIDE ACETATE 50 MG/1
50 TABLET ORAL 2 TIMES DAILY
Qty: 60 TABLET | Refills: 3 | Status: SHIPPED | OUTPATIENT
Start: 2017-12-18 | End: 2018-04-18

## 2017-12-18 RX ORDER — CLOBETASOL PROPIONATE 0.5 MG/G
CREAM TOPICAL
Refills: 3 | COMMUNITY
Start: 2017-04-17

## 2017-12-18 NOTE — MR AVS SNAPSHOT
After Visit Summary   12/18/2017    Robert Felix    MRN: [id number]           Patient Information     Date Of Birth          1955        Visit Information        Provider Department      12/18/2017 10:45 AM Pascual Flanagan MD [facility]        Today's Diagnoses     Palpitations    -  1    Paroxysmal atrial fibrillation (H)        Aortic valve disorder        Anomalous atrioventricular excitation        S/P ablation of accessory bypass tract           Follow-ups after your visit        Additional Services     Follow-Up with Cardiac Advanced Practice Provider                 Future tests that were ordered for you today     Open Future Orders        Priority Expected Expires Ordered    EKG 12-lead complete w/read - Clinics (to be scheduled) Routine 1/17/2018 12/18/2018 12/18/2017    Follow-Up with Cardiac Advanced Practice Provider Routine 1/17/2018 12/18/2018 12/18/2017    Exercise Stress Test (Stress ECG) Routine 12/21/2017 12/18/2018 12/18/2017            Who to contact     If you have questions or need follow up information about today's clinic visit or your schedule please contact [facility] directly at [phone].  Normal or non-critical lab and imaging results will be communicated to you by MyChart, letter or phone within 4 business days after the clinic has received the results. If you do not hear from us within 7 days, please contact the clinic through McPhyhart or phone. If you have a critical or abnormal lab result, we will notify you by phone as soon as possible.  Submit refill requests through [employer] or call your pharmacy and they will forward the refill request to us. Please allow 3 business days for your refill to be completed.          Additional Information About Your Visit        McPhyhart Information     [employer] lets you send messages to your doctor, view your test results, renew your  "prescriptions, schedule appointments and more. To sign up, go to [email or web address] . Click on \"Log in\" on the left side of the screen, which will take you to the Welcome page. Then click on \"Sign up Now\" on the right side of the page.     You will be asked to enter the access code listed below, as well as some personal information. Please follow the directions to create your username and password.     Your access code is: [email or web address]  Expires: 3/18/2018 12:00 PM     Your access code will  in 90 days. If you need help or a new code, please call your [place] clinic or [phone].        Care EveryWhere ID     This is your Care EveryWhere ID. This could be used by other organizations to access your [place] medical records  [id number]        Your Vitals Were     Pulse Height BMI (Body Mass Index)             72 1.829 m (6') 26.04 kg/m2          Blood Pressure from Last 3 Encounters:   17 132/82   17 130/75   17 138/85    Weight from Last 3 Encounters:   17 87.1 kg (192 lb)   17 89.8 kg (198 lb)   17 88.9 kg (196 lb)              We Performed the Following     Follow-Up with Cardiologist          Today's Medication Changes          These changes are accurate as of: 17 12:00 PM.  If you have any questions, ask your nurse or doctor.               Start taking these medicines.        Dose/Directions    flecainide 50 MG tablet   Commonly known as:  TAMBOCOR   Used for:  Paroxysmal atrial fibrillation (H)   Started by:  Pascual Flanagan MD        Dose:  50 mg   Take 1 tablet (50 mg) by mouth 2 times daily   Quantity:  60 tablet   Refills:  3            Where to get your medicines      These medications were sent to [facility]/pharmacy #[id number] - [facility] [address] [place] [address]     Phone:  [phone]     flecainide 50 MG tablet                Primary Care Provider Office Phone # Fax #    Pete Pham [phone] " [phone]       [facility] [address]   [facility] [address]        Equal Access to Services     VINCENT HAM : Hadii aad ku hadnavjoto Soneeta, waaxda luqadaha, qaybta kaalmada ger, ynes arellano laSolrahul marques. So [facility] [phone].    ATENCIÓN: Si habla español, tiene a barcenas disposición servicios gratuitos de asistencia lingüística. Llame al [phone].    We comply with applicable federal civil rights laws and [place] laws. We do not discriminate on the basis of race, color, national origin, age, disability, sex, sexual orientation, or gender identity.            Thank you!     Thank you for choosing [facility]  for your care. Our goal is always to provide you with excellent care. Hearing back from our patients is one way we can continue to improve our services. Please take a few minutes to complete the written survey that you may receive in the mail after your visit with us. Thank you!             Your Updated Medication List - Protect others around you: Learn how to safely use, store and throw away your medicines at www.disposemymeds.org.          This list is accurate as of: 12/18/17 12:00 PM.  Always use your most recent med list.                   Brand Name Dispense Instructions for use Diagnosis    clobetasol 0.05 % cream    TEMOVATE     APPLY 1 APPLICATION TO SKIN ONCE A DAY AS NEEDED (HAND DERMATITIS).        flecainide 50 MG tablet    TAMBOCOR    60 tablet    Take 1 tablet (50 mg) by mouth 2 times daily    Paroxysmal atrial fibrillation (H)

## 2017-12-18 NOTE — LETTER
12/18/2017      Pete Pham MD  [facility] [address]  [facility] [place] [address]      RE: Robert Felix       Dear Colleague,    I had the pleasure of seeing Robert Felix in the [facility].    PRIMARY CARE PHYSICIAN:  Pete Pham DO      HISTORY OF PRESENT ILLNESS:  I again had the pleasure of seeing your patient, Robert Felix, at [facility] for evaluation of palpitations and paroxysmal atrial fibrillation.  The patient has noted a previous diagnosis of Brian-Parkinson-White syndrome with ablation in 1992.  The patient speaks Icelandic and his daughter acts as the .  He states that prior to the year 2000 he was having palpitations with a regular supraventricular tachycardia.  After the year 2000, he felt that his arrhythmias would be quite irregular and at times fast.  A 48-hour Holter monitor performed on 04/11 indicated paroxysmal atrial fibrillation with occasional premature ventricular contractions and occasional short bursts of supraventricular tachycardia.  A stress echocardiogram performed 04/20/2017 demonstrated the patient exercising 7 minutes free of angina.  There were no areas of ischemia or infarction.  His resting blood pressure was 128/80 with a peak blood pressure of 180/70.  Analysis of his valves from a stress echo showed a bicuspid aortic valve with a raphe between the right and left coronary cusps.  There was only trace aortic insufficiency and no hemodynamically significant valvular aortic stenosis.  The aortic root was dilated to 4 cm but normal ascending aorta.  RVSP was 23 mmHg plus right atrial pressure.  The patient continues to have palpitations.  He notes after he eats, frequent episodes where his heart is irregular and at times not fast.  His daughter notes that he loses color in his face and the patient develops pressure in the chest.  He does not do any formal exercise.   He denies significant shortness of breath.  He denies syncope or presyncope.  He notes October was a good month for arrhythmias but in the last month, he has had significant palpitations on a daily basis.  He has been unable to find triggers for his arrhythmias.  He thinks it may be due to food or drinking fruit juices.  He notes that when it occurs at night, he urinates more frequently.      PHYSICAL EXAMINATION:  As below.      ASSESSMENT:   1.  Robert Felix is a pleasant 62-year-old male originally from the [facility] who presents for ongoing palpitations with evidence of supraventricular arrhythmias including paroxysmal atrial fibrillation.  His CHADS-VASc score is 0.  His heart rate is slow enough where I do not believe adding a beta blocker will be very helpful.  We could add a low dose such as metoprolol 12.5 mg b.i.d.  Instead I think it is prudent to consider adding flecainide 50 mg b.i.d.  I have explained to this patient the risks and benefits of this medication.  We tried to avoid medications for 6 months to no avail.  We will perform a treadmill stress test after 5 doses of this medicine.  If there are no proarrhythmias, I will have my KARO see him at 30 days and decide whether he has had some symptom relief.  If this dose is effective, then we will perform a 2-week ZIO Patch to assess his rhythms.  If, in fact, he continues to complain of significant palpitations, we  will move up to 100 mg b.i.d. and then see him 1 month later.  We will perform EKGs at both outpatient visits.  A ZIO Patch would be performed at the higher dose as well.  A stress test would have to be performed at the higher dose after 3 days as well.  I have asked this patient to call my office for any side effects or ongoing severe palpitations.   2.  History of Brian-Parkinson-White syndrome with previous ablation.  His EKG does not show a delta wave.  We will monitor him for any evidence of preexcitation.   3.  The patient has  evidence of a bicuspid aortic valve and mild aortic root enlargement.  We will plan another echocardiogram in 04/2018.  No SBE prophylaxis is necessary.      It is my pleasure to assist in the care of Robert Felix.  Our plan is to try to allow this 62-year-old gentleman to lead as normal life as possible without too many restrictions.  Will see how he does over the next 30-60 days on the flecainide.  All the patient's questions were answered to his satisfaction.      Thirty-five minutes were spent with the patient and daughter.  Greater than 50% of that time was counseling.      Outpatient Encounter Prescriptions as of 12/18/2017   Medication Sig Dispense Refill     clobetasol (TEMOVATE) 0.05 % cream APPLY 1 APPLICATION TO SKIN ONCE A DAY AS NEEDED (HAND DERMATITIS).  3     flecainide (TAMBOCOR) 50 MG tablet Take 1 tablet (50 mg) by mouth 2 times daily 60 tablet 3     [DISCONTINUED] metoprolol (LOPRESSOR) 25 MG tablet Take 1 tablet (25 mg) by mouth 2 times daily (Patient taking differently: Take 25 mg by mouth 2 times daily Has not started yet.) 180 tablet 3     No facility-administered encounter medications on file as of 12/18/2017.      Again, thank you for allowing me to participate in the care of your patient.      Sincerely,    Pascual Flanagan MD     [facility]

## 2017-12-18 NOTE — PROGRESS NOTES
PRIMARY CARE PHYSICIAN:  Pete Pham DO      HISTORY OF PRESENT ILLNESS:  I again had the pleasure of seeing your patient, Robert Felix, at [facility] for evaluation of palpitations and paroxysmal atrial fibrillation.  The patient has noted a previous diagnosis of Brian-Parkinson-White syndrome with ablation in 1992.  The patient speaks Spanish and his daughter acts as the .  He states that prior to the year 2000 he was having palpitations with a regular supraventricular tachycardia.  After the year 2000, he felt that his arrhythmias would be quite irregular and at times fast.  A 48-hour Holter monitor performed on 04/11 indicated paroxysmal atrial fibrillation with occasional premature ventricular contractions and occasional short bursts of supraventricular tachycardia.  A stress echocardiogram performed 04/20/2017 demonstrated the patient exercising 7 minutes free of angina.  There were no areas of ischemia or infarction.  His resting blood pressure was 128/80 with a peak blood pressure of 180/70.  Analysis of his valves from a stress echo showed a bicuspid aortic valve with a raphe between the right and left coronary cusps.  There was only trace aortic insufficiency and no hemodynamically significant valvular aortic stenosis.  The aortic root was dilated to 4 cm but normal ascending aorta.  RVSP was 23 mmHg plus right atrial pressure.  The patient continues to have palpitations.  He notes after he eats, frequent episodes where his heart is irregular and at times not fast.  His daughter notes that he loses color in his face and the patient develops pressure in the chest.  He does not do any formal exercise.  He denies significant shortness of breath.  He denies syncope or presyncope.  He notes October was a good month for arrhythmias but in the last month, he has had significant palpitations on a daily basis.  He has been unable to find triggers for his arrhythmias.  He  thinks it may be due to food or drinking fruit juices.  He notes that when it occurs at night, he urinates more frequently.      PHYSICAL EXAMINATION:  As below.      ASSESSMENT:   1.  Robert Felix is a pleasant 62-year-old male originally from the [facility] who presents for ongoing palpitations with evidence of supraventricular arrhythmias including paroxysmal atrial fibrillation.  His CHADS-VASc score is 0.  His heart rate is slow enough where I do not believe adding a beta blocker will be very helpful.  We could add a low dose such as metoprolol 12.5 mg b.i.d.  Instead I think it is prudent to consider adding flecainide 50 mg b.i.d.  I have explained to this patient the risks and benefits of this medication.  We tried to avoid medications for 6 months to no avail.  We will perform a treadmill stress test after 5 doses of this medicine.  If there are no proarrhythmias, I will have my KARO see him at 30 days and decide whether he has had some symptom relief.  If this dose is effective, then we will perform a 2-week ZIO Patch to assess his rhythms.  If, in fact, he continues to complain of significant palpitations, we  will move up to 100 mg b.i.d. and then see him 1 month later.  We will perform EKGs at both outpatient visits.  A ZIO Patch would be performed at the higher dose as well.  A stress test would have to be performed at the higher dose after 3 days as well.  I have asked this patient to call my office for any side effects or ongoing severe palpitations.   2.  History of Brian-Parkinson-White syndrome with previous ablation.  His EKG does not show a delta wave.  We will monitor him for any evidence of preexcitation.   3.  The patient has evidence of a bicuspid aortic valve and mild aortic root enlargement.  We will plan another echocardiogram in 04/2018.  No SBE prophylaxis is necessary.      It is my pleasure to assist in the care of Robert Felix.  Our plan is to try to allow this 62-year-old  gentleman to lead as normal life as possible without too many restrictions.  Will see how he does over the next 30-60 days on the flecainide.  All the patient's questions were answered to his satisfaction.      Thirty-five minutes were spent with the patient and daughter.  Greater than 50% of that time was counseling.      Pascual Flanagan MD      cc:   Pete Pham [facility]   [address]   [place] [address]         PASCUAL FLANAGAN MD, [facility]             D: 2017 12:11   T: 2017 16:23   MT: al      Name:     SVEN MESA   MRN:      [id number]        Account:      [id number]   :      1955           Service Date: 2017      Document: [id number]

## 2017-12-18 NOTE — PROGRESS NOTES
HPI and Plan:   See dictation:[id number]    Orders Placed This Encounter   Procedures     Follow-Up with Cardiac Advanced Practice Provider     Exercise Stress Test (Stress ECG)     EKG 12-lead complete w/read - Clinics (to be scheduled)       Orders Placed This Encounter   Medications     clobetasol (TEMOVATE) 0.05 % cream     Sig: APPLY 1 APPLICATION TO SKIN ONCE A DAY AS NEEDED (HAND DERMATITIS).     Refill:  3     flecainide (TAMBOCOR) 50 MG tablet     Sig: Take 1 tablet (50 mg) by mouth 2 times daily     Dispense:  60 tablet     Refill:  3       Medications Discontinued During This Encounter   Medication Reason     metoprolol (LOPRESSOR) 25 MG tablet Not filled/taken by Patient         Encounter Diagnoses   Name Primary?     Palpitations Yes     Paroxysmal atrial fibrillation (H)      Aortic valve disorder      Anomalous atrioventricular excitation      S/P ablation of accessory bypass tract        CURRENT MEDICATIONS:  Current Outpatient Prescriptions   Medication Sig Dispense Refill     clobetasol (TEMOVATE) 0.05 % cream APPLY 1 APPLICATION TO SKIN ONCE A DAY AS NEEDED (HAND DERMATITIS).  3     flecainide (TAMBOCOR) 50 MG tablet Take 1 tablet (50 mg) by mouth 2 times daily 60 tablet 3       ALLERGIES   No Known Allergies    PAST MEDICAL HISTORY:  History reviewed. No pertinent past medical history.    PAST SURGICAL HISTORY:  History reviewed. No pertinent surgical history.    FAMILY HISTORY:  Family History   Problem Relation Age of Onset     CANCER Mother      Angina Father        SOCIAL HISTORY:  Social History     Social History     Marital status:      Spouse name: N/A     Number of children: N/A     Years of education: N/A     Social History Main Topics     Smoking status: Never Smoker     Smokeless tobacco: Never Used     Alcohol use None     Drug use: No     Sexual activity: Not Asked     Other Topics Concern     Parent/Sibling W/ Cabg, Mi Or Angioplasty Before 65f 55m? No     Social History  Narrative       Review of Systems:  Skin:  Negative       Eyes:  Negative      ENT:  Negative      Respiratory:  Negative       Cardiovascular:    Positive for;palpitations;fatigue (increased episodes of atrial fib. )    Gastroenterology: Negative      Genitourinary:  not assessed      Musculoskeletal:  Negative      Neurologic:  Negative      Psychiatric:  Negative      Heme/Lymph/Imm:  Positive for allergies    Endocrine:  Negative        Physical Exam:  Vitals: /82  Pulse 72  Ht 1.829 m (6')  Wt 87.1 kg (192 lb)  BMI 26.04 kg/m2    Constitutional:  cooperative, alert and oriented, well developed, well nourished, in no acute distress        Skin:  warm and dry to the touch, no apparent skin lesions or masses noted          Head:  normocephalic, no masses or lesions        Eyes:  pupils equal and round, conjunctivae and lids unremarkable, sclera white, no xanthalasma, EOMS intact, no nystagmus        Lymph:      ENT:  no pallor or cyanosis, dentition good        Neck:  carotid pulses are full and equal bilaterally, JVP normal, no carotid bruit        Respiratory:  normal breath sounds, clear to auscultation, normal A-P diameter, normal symmetry, normal respiratory excursion, no use of accessory muscles         Cardiac: regular rhythm, normal S1/S2, no S3 or S4, apical impulse not displaced, no murmurs, gallops or rubs             variably split S1  pulses full and equal, no bruits auscultated                                        GI:  abdomen soft, non-tender, BS normoactive, no mass, no HSM, no bruits        Extremities and Muscular Skeletal:  no deformities, clubbing, cyanosis, erythema observed;no edema              Neurological:  no gross motor deficits;affect appropriate        Psych:  Alert and Oriented x 3        CC  Pascual Flanagan MD  [address]  [place] [id number]

## 2017-12-21 ENCOUNTER — HOSPITAL ENCOUNTER (OUTPATIENT)
Dept: CARDIOLOGY | Facility: CLINIC | Age: 62
Discharge: HOME | End: 2017-12-21
Attending: INTERNAL MEDICINE | Admitting: INTERNAL MEDICINE
Payer: COMMERCIAL

## 2017-12-21 DIAGNOSIS — I48.0 PAROXYSMAL ATRIAL FIBRILLATION (H): ICD-10-CM

## 2017-12-21 PROCEDURE — 93017 CV STRESS TEST TRACING ONLY: CPT

## 2017-12-21 PROCEDURE — 93018 CV STRESS TEST I&R ONLY: CPT | Performed by: INTERNAL MEDICINE

## 2017-12-21 PROCEDURE — 93016 CV STRESS TEST SUPVJ ONLY: CPT | Performed by: INTERNAL MEDICINE

## 2017-12-26 ENCOUNTER — TELEPHONE (OUTPATIENT)
Dept: CARDIOLOGY | Facility: CLINIC | Age: 62
End: 2017-12-26

## 2017-12-26 NOTE — TELEPHONE ENCOUNTER
Notes Recorded by Pascual Flanagan MD on 12/21/2017 at 9:55 PM  Normal stress test.  Some PACs.  Continue with the Flecainide and my plan as per the recent OV.  Pascual Flanagan MD, [facility]  December 21, 2017 9:55 PM    OV 12/18/17 with Dr. Flanagan   We will perform a treadmill stress test after 5 doses of this medicine.  If there are no proarrhythmias, I will have my KARO see him at 30 days and decide whether he has had some symptom relief.  If this dose is effective, then we will perform a 2-week ZIO Patch to assess his rhythms.  If, in fact, he continues to complain of significant palpitations, we  will move up to 100 mg b.i.d. and then see him 1 month later.  We will perform EKGs at both outpatient visits.  A ZIO Patch would be performed at the higher dose as well.  A stress test would have to be performed at the higher dose after 3 days as well.  I have asked this patient to call my office for any side effects or ongoing severe palpitations.       Pt scheduled to see Conchis NP 1/29/18     Spoke to patients daughter regarding result above. Pts daughter verbalized understanding and confirmed OV 1/29/17 with Conchis.

## 2018-02-07 ENCOUNTER — OFFICE VISIT (OUTPATIENT)
Dept: CARDIOLOGY | Facility: CLINIC | Age: 63
End: 2018-02-07
Payer: COMMERCIAL

## 2018-02-07 VITALS
BODY MASS INDEX: 27.36 KG/M2 | HEIGHT: 72 IN | HEART RATE: 73 BPM | SYSTOLIC BLOOD PRESSURE: 120 MMHG | WEIGHT: 202 LBS | DIASTOLIC BLOOD PRESSURE: 72 MMHG

## 2018-02-07 DIAGNOSIS — R00.2 PALPITATIONS: ICD-10-CM

## 2018-02-07 DIAGNOSIS — I48.0 PAROXYSMAL ATRIAL FIBRILLATION (H): ICD-10-CM

## 2018-02-07 PROCEDURE — 93000 ELECTROCARDIOGRAM COMPLETE: CPT | Performed by: PHYSICIAN ASSISTANT

## 2018-02-07 PROCEDURE — 99214 OFFICE O/P EST MOD 30 MIN: CPT | Performed by: PHYSICIAN ASSISTANT

## 2018-02-07 NOTE — LETTER
2/7/2018    Pete Pham MD  [facility] [address]  [facility] [address]    RE: Robert Felix       Dear Colleague,    I had the pleasure of seeing Robert Felix in the [facility].      Cardiology Progress Note    Date of Service: 02/07/2018  Patient seen today in follow up of: PAF, PSVT  Primary cardiologist: Dr. Flanagan    HPI:  Robert Felix is a very pleasant 62 year old male with a history of palptiations and paroxysmal atrial fibrillation. He was referred to cardiology and initially seen by Dr. Flanagan on 4/6/17. He has a previous diagnosis of Brian-Parkson-White syndrome with ablation in 1992. He continue to have palpitations over the last several years. A holter monitor was ordered which showed paroxysmal atrial fibrillation with occasisional PVCs and occasional short bursts of supraventricular tachycardia. A stress echocardiogram at that time demonstrated no areas of ischemia or infarction. He exercised for 7 minutes without symptoms. There was mention on his echocardiogram of a bicuspid aortic valve with trace aortic insufficiency and no hemodynamically significant valvular aortic stenosis. The aortic root was dilated to 4 cm but nomal ascending aorta. RVSP was 22 mmHg plus R atrial pressure. He followed up with Dr. Flanagan in May of 2017. At that time, beta blockers were avoided due to a resting heart rate in the 60s. The patient decided his rhythms were tolerable and he wanted to avoid medications at that time.    On 12/18/17 he returned to the office as his palpitations were increasing. He was noting them on a daily basis. He was started on flecainide 50 mg twice daily. An exercise stress test was done on 12/21/17 which showed some PACs but no other arrythmias.     He returns today for follow up. He tells me he feels quite good. His palpitations have significantly improved. Prior to the initiation of flecainide he was  having daily palpitations lasting up to 11 hours. He now reports around 5 episodes of palpitations total since starting the medicine lasting 10 - 20 minutes. He notices these primarily at night. He denies any shortness of breath, chest discomfort, peripheral edema, lightheadedness, or dizziness.     ASSESSMENT/PLAN:  1.   Palpitations secondary to paroxysmal atrial fibrillation, PSVT. Recently initiated on Flecainide 50 mg twice daily. No proarrhythmias on recent treadmill stress test.  His symptoms have significantly improved and are quite tolerable on 50 mg twice daily of flecainide. He prefers to continue this dose for new. His EKG today which was reviewed by me shows normal sinus rhythm. Per Dr. Flanagan's recommendations, I have ordered a two week Ziopatch monitor for assessment of his arrhythmias. We will be in touch after that.  I have asked him to contact us if he were to develop any side effects or if his palpitations were to worsen. We could consider increasing his Flecainide dose to 100 mg twice daily at that time. Repeat stress testing would be needed at that point.   2.   Hx Brian-Parkinson-White syndrome. S/p prior ablation.   3.   Bicuspid aortic valve and mild aortic root enlargement. Repeat echocardiogram planned for May of 2018. We will get him back in to see Dr. Flanagan at that time.    Orders this Visit:  Orders Placed This Encounter   Procedures     Follow-Up with Cardiologist     EKG 12-lead complete w/read - Clinics (performed today)     Zio Patch Monitor     No orders of the defined types were placed in this encounter.    There are no discontinued medications.    CURRENT MEDICATIONS:  Current Outpatient Prescriptions   Medication Sig Dispense Refill     clobetasol (TEMOVATE) 0.05 % cream APPLY 1 APPLICATION TO SKIN ONCE A DAY AS NEEDED (HAND DERMATITIS).  3     flecainide (TAMBOCOR) 50 MG tablet Take 1 tablet (50 mg) by mouth 2 times daily 60 tablet 3     ALLERGIES  No Known Allergies  PAST  MEDICAL HISTORY:  No past medical history on file.  PAST SURGICAL HISTORY:  No past surgical history on file.  FAMILY HISTORY:  Family History   Problem Relation Age of Onset     CANCER Mother      Angina Father      SOCIAL HISTORY:  Social History     Social History     Marital status:      Spouse name: N/A     Number of children: N/A     Years of education: N/A     Social History Main Topics     Smoking status: Never Smoker     Smokeless tobacco: Never Used     Alcohol use None     Drug use: No     Sexual activity: Not Asked     Other Topics Concern     Parent/Sibling W/ Cabg, Mi Or Angioplasty Before 65f 55m? No     Social History Narrative     Review of Systems:  Negative unless otherwise mentioned in the HPI    Physical Exam:  Vitals: /72  Pulse 73  Ht 1.829 m (6')  Wt 91.6 kg (202 lb)  BMI 27.4 kg/m2   Wt Readings from Last 4 Encounters:   02/07/18 91.6 kg (202 lb)   12/18/17 87.1 kg (192 lb)   05/01/17 89.8 kg (198 lb)   04/06/17 88.9 kg (196 lb)     GEN: well nourished, in no acute distress.  HEENT:  Pupils equal, round. Sclerae nonicteric.   NECK: Supple, no masses appreciated.   C/V:  Regular rate and rhythm, no murmur, rub or gallop.    RESP: Respirations are unlabored. Clear to auscultation bilaterally without wheezing, rales, or rhonchi.  GI: Abdomen soft, nontender, nondistended.  EXTREM: No LE edema.  NEURO: Alert and oriented, cooperative.  SKIN: Warm and dry.     Recent Lab Results:  None    Susanna Salinas PA-C  [facility]      Thank you for allowing me to participate in the care of your patient.    Sincerely,     Susanna Salinas PA-C     [facility]

## 2018-02-07 NOTE — MR AVS SNAPSHOT
After Visit Summary   2/7/2018    Robert Felix    MRN: [id number]           Patient Information     Date Of Birth          1955        Visit Information        Provider Department      2/7/2018 3:10 PM Susanna Salinas PA-C [facility]        Today's Diagnoses     Palpitations        Paroxysmal atrial fibrillation (H)          Care Instructions    Thank you for your [facility] visit today. Your provider has recommended the following:  Medication Changes:  No medication changes today.  Recommendations:  Have the monitor done. We'll scheduled this today and have your wear it for two weeks to assess your arrhythmias.   Reminder:  Please bring in all current medications, over the counter supplements and vitamin bottles to your next appointment.            [facility]            Follow-ups after your visit        Additional Services     Follow-Up with Cardiologist                 Future tests that were ordered for you today     Open Future Orders        Priority Expected Expires Ordered    Follow-Up with Cardiologist Routine 8/6/2018 2/7/2019 2/7/2018    Zio Patch Monitor Routine 2/14/2018 2/7/2019 2/7/2018            Who to contact     If you have questions or need follow up information about today's clinic visit or your schedule please contact [facility] directly at [phone].  Normal or non-critical lab and imaging results will be communicated to you by MyChart, letter or phone within 4 business days after the clinic has received the results. If you do not hear from us within 7 days, please contact the clinic through MyChart or phone. If you have a critical or abnormal lab result, we will notify you by phone as soon as possible.  Submit refill requests through YouMail or call your pharmacy and they will forward the refill request to us. Please allow 3 business days for your  "refill to be completed.          Additional Information About Your Visit        [employer] Information     [facility] lets you send messages to your doctor, view your test results, renew your prescriptions, schedule appointments and more. To sign up, go to [email or web address] . Click on \"Log in\" on the left side of the screen, which will take you to the Welcome page. Then click on \"Sign up Now\" on the right side of the page.     You will be asked to enter the access code listed below, as well as some personal information. Please follow the directions to create your username and password.     Your access code is: [email or web address]  Expires: 3/18/2018 12:00 PM     Your access code will  in 90 days. If you need help or a new code, please call your [place] clinic or [phone].        Care EveryWhere ID     This is your Care EveryWhere ID. This could be used by other organizations to access your [place] medical records  [id number]        Your Vitals Were     Pulse Height BMI (Body Mass Index)             73 1.829 m (6') 27.4 kg/m2          Blood Pressure from Last 3 Encounters:   18 120/72   17 132/82   17 130/75    Weight from Last 3 Encounters:   18 91.6 kg (202 lb)   17 87.1 kg (192 lb)   17 89.8 kg (198 lb)              We Performed the Following     EKG 12-lead complete w/read - Clinics (performed today)     Follow-Up with Cardiac Advanced Practice Provider        Primary Care Provider Office Phone # Fax #    Pete Pham [phone]       [facility] [address]   [facility] [address]        Equal Access to Services     VINCENT HAM : Phillip Gonzalez, gem king, martin cyr, ynes marques. So [facility] [phone].    ATENCIÓN: Si habla español, tiene a barcenas disposición servicios gratuitos de asistencia lingüística. Henrique al [phone].    We comply with applicable " federal civil rights laws and [place] laws. We do not discriminate on the basis of race, color, national origin, age, disability, sex, sexual orientation, or gender identity.            Thank you!     Thank you for choosing [facility]  for your care. Our goal is always to provide you with excellent care. Hearing back from our patients is one way we can continue to improve our services. Please take a few minutes to complete the written survey that you may receive in the mail after your visit with us. Thank you!             Your Updated Medication List - Protect others around you: Learn how to safely use, store and throw away your medicines at www.disposemymeds.org.          This list is accurate as of 2/7/18  3:31 PM.  Always use your most recent med list.                   Brand Name Dispense Instructions for use Diagnosis    clobetasol 0.05 % cream    TEMOVATE     APPLY 1 APPLICATION TO SKIN ONCE A DAY AS NEEDED (HAND DERMATITIS).        flecainide 50 MG tablet    TAMBOCOR    60 tablet    Take 1 tablet (50 mg) by mouth 2 times daily    Paroxysmal atrial fibrillation (H)

## 2018-02-07 NOTE — PATIENT INSTRUCTIONS
Thank you for your [facility] visit today. Your provider has recommended the following:  Medication Changes:  No medication changes today.  Recommendations:  Have the monitor done. We'll scheduled this today and have your wear it for two weeks to assess your arrhythmias.   Reminder:  Please bring in all current medications, over the counter supplements and vitamin bottles to your next appointment.            [facility]

## 2018-02-07 NOTE — PROGRESS NOTES
Cardiology Progress Note    Date of Service: 02/07/2018  Patient seen today in follow up of: PAF, PSVT  Primary cardiologist: Dr. Flanagan    HPI:  Robert Felix is a very pleasant 62 year old male with a history of palptiations and paroxysmal atrial fibrillation. He was referred to cardiology and initially seen by Dr. Flanagan on 4/6/17. He has a previous diagnosis of Brian-Parkson-White syndrome with ablation in 1992. He continue to have palpitations over the last several years. A holter monitor was ordered which showed paroxysmal atrial fibrillation with occasisional PVCs and occasional short bursts of supraventricular tachycardia. A stress echocardiogram at that time demonstrated no areas of ischemia or infarction. He exercised for 7 minutes without symptoms. There was mention on his echocardiogram of a bicuspid aortic valve with trace aortic insufficiency and no hemodynamically significant valvular aortic stenosis. The aortic root was dilated to 4 cm but nomal ascending aorta. RVSP was 22 mmHg plus R atrial pressure. He followed up with Dr. Flanagan in May of 2017. At that time, beta blockers were avoided due to a resting heart rate in the 60s. The patient decided his rhythms were tolerable and he wanted to avoid medications at that time.    On 12/18/17 he returned to the office as his palpitations were increasing. He was noting them on a daily basis. He was started on flecainide 50 mg twice daily. An exercise stress test was done on 12/21/17 which showed some PACs but no other arrythmias.     He returns today for follow up. He tells me he feels quite good. His palpitations have significantly improved. Prior to the initiation of flecainide he was having daily palpitations lasting up to 11 hours. He now reports around 5 episodes of palpitations total since starting the medicine lasting 10 - 20 minutes. He notices these primarily at night. He denies any shortness of breath, chest discomfort, peripheral  edema, lightheadedness, or dizziness.     ASSESSMENT/PLAN:  1.   Palpitations secondary to paroxysmal atrial fibrillation, PSVT. Recently initiated on Flecainide 50 mg twice daily. No proarrhythmias on recent treadmill stress test.  His symptoms have significantly improved and are quite tolerable on 50 mg twice daily of flecainide. He prefers to continue this dose for new. His EKG today which was reviewed by me shows normal sinus rhythm. Per Dr. Flanagan's recommendations, I have ordered a two week Ziopatch monitor for assessment of his arrhythmias. We will be in touch after that.  I have asked him to contact us if he were to develop any side effects or if his palpitations were to worsen. We could consider increasing his Flecainide dose to 100 mg twice daily at that time. Repeat stress testing would be needed at that point.   2.   Hx Brian-Parkinson-White syndrome. S/p prior ablation.   3.   Bicuspid aortic valve and mild aortic root enlargement. Repeat echocardiogram planned for May of 2018. We will get him back in to see Dr. Flanagan at that time.    Orders this Visit:  Orders Placed This Encounter   Procedures     Follow-Up with Cardiologist     EKG 12-lead complete w/read - Clinics (performed today)     Zio Patch Monitor     No orders of the defined types were placed in this encounter.    There are no discontinued medications.    CURRENT MEDICATIONS:  Current Outpatient Prescriptions   Medication Sig Dispense Refill     clobetasol (TEMOVATE) 0.05 % cream APPLY 1 APPLICATION TO SKIN ONCE A DAY AS NEEDED (HAND DERMATITIS).  3     flecainide (TAMBOCOR) 50 MG tablet Take 1 tablet (50 mg) by mouth 2 times daily 60 tablet 3     ALLERGIES  No Known Allergies  PAST MEDICAL HISTORY:  No past medical history on file.  PAST SURGICAL HISTORY:  No past surgical history on file.  FAMILY HISTORY:  Family History   Problem Relation Age of Onset     CANCER Mother      Angina Father      SOCIAL HISTORY:  Social History     Social  History     Marital status:      Spouse name: N/A     Number of children: N/A     Years of education: N/A     Social History Main Topics     Smoking status: Never Smoker     Smokeless tobacco: Never Used     Alcohol use None     Drug use: No     Sexual activity: Not Asked     Other Topics Concern     Parent/Sibling W/ Cabg, Mi Or Angioplasty Before 65f 55m? No     Social History Narrative     Review of Systems:  Negative unless otherwise mentioned in the HPI    Physical Exam:  Vitals: /72  Pulse 73  Ht 1.829 m (6')  Wt 91.6 kg (202 lb)  BMI 27.4 kg/m2   Wt Readings from Last 4 Encounters:   02/07/18 91.6 kg (202 lb)   12/18/17 87.1 kg (192 lb)   05/01/17 89.8 kg (198 lb)   04/06/17 88.9 kg (196 lb)     GEN: well nourished, in no acute distress.  HEENT:  Pupils equal, round. Sclerae nonicteric.   NECK: Supple, no masses appreciated.   C/V:  Regular rate and rhythm, no murmur, rub or gallop.    RESP: Respirations are unlabored. Clear to auscultation bilaterally without wheezing, rales, or rhonchi.  GI: Abdomen soft, nontender, nondistended.  EXTREM: No LE edema.  NEURO: Alert and oriented, cooperative.  SKIN: Warm and dry.     Recent Lab Results:  None    Susanna Salinas PA-C  [facility]

## 2018-02-12 ENCOUNTER — HOSPITAL ENCOUNTER (OUTPATIENT)
Dept: CARDIOLOGY | Facility: CLINIC | Age: 63
Discharge: HOME | End: 2018-02-12
Attending: PHYSICIAN ASSISTANT | Admitting: PHYSICIAN ASSISTANT
Payer: COMMERCIAL

## 2018-02-12 DIAGNOSIS — I48.0 PAROXYSMAL ATRIAL FIBRILLATION (H): ICD-10-CM

## 2018-02-12 PROCEDURE — 0296T ZIO PATCH HOLTER: CPT

## 2018-02-12 PROCEDURE — 0298T ZZC EXT ECG > 48HR TO 21 DAY REVIEW AND INTERPRETATN: CPT | Performed by: INTERNAL MEDICINE

## 2018-04-18 DIAGNOSIS — I48.0 PAROXYSMAL ATRIAL FIBRILLATION (H): ICD-10-CM

## 2018-04-18 RX ORDER — FLECAINIDE ACETATE 50 MG/1
50 TABLET ORAL 2 TIMES DAILY
Qty: 180 TABLET | Refills: 3 | Status: SHIPPED | OUTPATIENT
Start: 2018-04-18 | End: 2019-04-18

## 2019-04-18 DIAGNOSIS — I48.0 PAROXYSMAL ATRIAL FIBRILLATION (H): ICD-10-CM

## 2019-04-18 RX ORDER — FLECAINIDE ACETATE 50 MG/1
50 TABLET ORAL 2 TIMES DAILY
Qty: 180 TABLET | Refills: 0 | Status: SHIPPED | OUTPATIENT
Start: 2019-04-18 | End: 2019-06-03

## 2019-04-18 NOTE — TELEPHONE ENCOUNTER
Received refill request for:  Flecainide  Last OV was: 2/7/2018 with MARTITA Philip  Labs/EKG: last EKG 2/7/2018  F/U scheduled: overdue orders.  Note to pharmacy and refill letter sent.  New script sent to: [facility]

## 2019-04-18 NOTE — LETTER
April 18, 2019       TO: Robert Felix  [address] [place] [id number]       Dear Robert Felix,    You have requested a medication refill for Flecainide and our records indicate that you have not been seen by one of our providers for your annual office visit, EKG and echocardiogram.  We will refill your medication for 3 months, which will allow you enough time to be seen by one of our providers.    Please call our clinic at [phone] to schedule your appointment as soon as possible.    Thank you,    [facility]

## 2019-06-03 DIAGNOSIS — I35.9 AORTIC VALVE DISORDER: Primary | ICD-10-CM

## 2019-06-03 DIAGNOSIS — I48.0 PAROXYSMAL ATRIAL FIBRILLATION (H): ICD-10-CM

## 2019-06-03 DIAGNOSIS — Z86.79 HISTORY OF WOLFF-PARKINSON-WHITE SYNDROME: ICD-10-CM

## 2019-06-03 RX ORDER — FLECAINIDE ACETATE 50 MG/1
50 TABLET ORAL 2 TIMES DAILY
Qty: 180 TABLET | Refills: 0 | Status: SHIPPED | OUTPATIENT
Start: 2019-06-03 | End: 2019-06-13

## 2019-06-03 NOTE — TELEPHONE ENCOUNTER
Patient's daughter Tyra called and reported that patient's insurance will be done end of July. Patient's daughter inquiring if patient can get in to be seen sooner then September (Dr. Flanagan's next available apt). RN inquired if patient's daughter would be willing to have patient see different cardiologist since patient has not seen Dr. Flanagan since 2017 and is asymptomatic. Patient's daughter is in agreement with plan. RN refilled flecainide for 3 months to allow sufficient amt of medication to last patient until next apt for further refills. RN transferred patient's daughter to scheduling to arrange OV.

## 2019-06-05 ENCOUNTER — HOSPITAL ENCOUNTER (OUTPATIENT)
Dept: CARDIOLOGY | Facility: CLINIC | Age: 64
Discharge: HOME | End: 2019-06-05
Attending: INTERNAL MEDICINE | Admitting: INTERNAL MEDICINE
Payer: COMMERCIAL

## 2019-06-05 DIAGNOSIS — I35.9 AORTIC VALVE DISORDER: ICD-10-CM

## 2019-06-05 PROCEDURE — 93306 TTE W/DOPPLER COMPLETE: CPT

## 2019-06-05 PROCEDURE — 93306 TTE W/DOPPLER COMPLETE: CPT | Mod: 26 | Performed by: INTERNAL MEDICINE

## 2019-06-06 ENCOUNTER — PRE VISIT (OUTPATIENT)
Dept: CARDIOLOGY | Facility: CLINIC | Age: 64
End: 2019-06-06

## 2019-06-06 DIAGNOSIS — I48.0 PAROXYSMAL ATRIAL FIBRILLATION (H): ICD-10-CM

## 2019-06-06 DIAGNOSIS — I35.9 AORTIC VALVE DISORDER: Primary | ICD-10-CM

## 2019-06-06 NOTE — TELEPHONE ENCOUNTER
Madi Brown MD  You; Patel [facility] 2 17 minutes ago (4:16 PM)      Fasting lipids, TSH, CBC, BMP, resting ECG. Thanks     SJ        Orders entered for labs. Contacted patient to arrange labs, patient declines to arrange at this time, states he has many bills and doesn't think he could afford it at this time.

## 2019-06-07 ENCOUNTER — TELEPHONE (OUTPATIENT)
Dept: CARDIOLOGY | Facility: CLINIC | Age: 64
End: 2019-06-07

## 2019-06-07 NOTE — TELEPHONE ENCOUNTER
Patient called stating he received a call yesterday regarding an upcoming appointment with Dr. Brown 6/13/19 but needs clarification of reason for call. In reviewing notes, there are labs requested prior to the visit and patient needs to schedule a lab visit prior to OV. Patient agreeable. Transferred to scheduling to set up.

## 2019-06-13 ENCOUNTER — OFFICE VISIT (OUTPATIENT)
Dept: CARDIOLOGY | Facility: CLINIC | Age: 64
End: 2019-06-13
Payer: COMMERCIAL

## 2019-06-13 VITALS
WEIGHT: 188.6 LBS | OXYGEN SATURATION: 99 % | DIASTOLIC BLOOD PRESSURE: 78 MMHG | HEART RATE: 54 BPM | HEIGHT: 72 IN | SYSTOLIC BLOOD PRESSURE: 122 MMHG | BODY MASS INDEX: 25.55 KG/M2

## 2019-06-13 DIAGNOSIS — I35.9 AORTIC VALVE DISORDER: ICD-10-CM

## 2019-06-13 DIAGNOSIS — I48.0 PAF (PAROXYSMAL ATRIAL FIBRILLATION) (H): ICD-10-CM

## 2019-06-13 DIAGNOSIS — Z86.79 HISTORY OF WOLFF-PARKINSON-WHITE (WPW) SYNDROME: ICD-10-CM

## 2019-06-13 DIAGNOSIS — I48.0 PAROXYSMAL ATRIAL FIBRILLATION (H): ICD-10-CM

## 2019-06-13 DIAGNOSIS — I77.89 AORTIC ROOT ENLARGEMENT (H): ICD-10-CM

## 2019-06-13 DIAGNOSIS — Q23.81 BICUSPID AORTIC VALVE: ICD-10-CM

## 2019-06-13 DIAGNOSIS — R00.2 PALPITATIONS: Primary | ICD-10-CM

## 2019-06-13 DIAGNOSIS — I35.1 MILD AORTIC REGURGITATION: ICD-10-CM

## 2019-06-13 LAB
ANION GAP SERPL CALCULATED.3IONS-SCNC: 11 MMOL/L (ref 6–17)
BASOPHILS # BLD AUTO: 0 10E9/L (ref 0–0.2)
BASOPHILS NFR BLD AUTO: 0.4 %
BUN SERPL-MCNC: 22 MG/DL (ref 7–30)
CALCIUM SERPL-MCNC: 9.5 MG/DL (ref 8.5–10.5)
CHLORIDE SERPL-SCNC: 105 MMOL/L (ref 98–107)
CHOLEST SERPL-MCNC: 207 MG/DL
CO2 SERPL-SCNC: 27 MMOL/L (ref 23–29)
CREAT SERPL-MCNC: 1.1 MG/DL (ref 0.7–1.3)
DIFFERENTIAL METHOD BLD: NORMAL
EOSINOPHIL # BLD AUTO: 0.2 10E9/L (ref 0–0.7)
EOSINOPHIL NFR BLD AUTO: 4.4 %
ERYTHROCYTE [DISTWIDTH] IN BLOOD BY AUTOMATED COUNT: 12.6 % (ref 10–15)
GFR SERPL CREATININE-BSD FRML MDRD: 68 ML/MIN/{1.73_M2}
GLUCOSE SERPL-MCNC: 114 MG/DL (ref 70–105)
HCT VFR BLD AUTO: 41.5 % (ref 40–53)
HDLC SERPL-MCNC: 42 MG/DL
HGB BLD-MCNC: 14.6 G/DL (ref 13.3–17.7)
IMM GRANULOCYTES # BLD: 0 10E9/L (ref 0–0.4)
IMM GRANULOCYTES NFR BLD: 0 %
LDLC SERPL CALC-MCNC: 143 MG/DL
LYMPHOCYTES # BLD AUTO: 1.6 10E9/L (ref 0.8–5.3)
LYMPHOCYTES NFR BLD AUTO: 35.9 %
MCH RBC QN AUTO: 31.9 PG (ref 26.5–33)
MCHC RBC AUTO-ENTMCNC: 35.2 G/DL (ref 31.5–36.5)
MCV RBC AUTO: 91 FL (ref 78–100)
MONOCYTES # BLD AUTO: 0.3 10E9/L (ref 0–1.3)
MONOCYTES NFR BLD AUTO: 7.3 %
NEUTROPHILS # BLD AUTO: 2.3 10E9/L (ref 1.6–8.3)
NEUTROPHILS NFR BLD AUTO: 52 %
NONHDLC SERPL-MCNC: 165 MG/DL
NRBC # BLD AUTO: 0 10*3/UL
NRBC BLD AUTO-RTO: 0 /100
PLATELET # BLD AUTO: 196 10E9/L (ref 150–450)
POTASSIUM SERPL-SCNC: 4 MMOL/L (ref 3.5–5.1)
RBC # BLD AUTO: 4.57 10E12/L (ref 4.4–5.9)
SODIUM SERPL-SCNC: 139 MMOL/L (ref 136–145)
TRIGL SERPL-MCNC: 111 MG/DL
TSH SERPL DL<=0.005 MIU/L-ACNC: 1.67 MU/L (ref 0.4–4)
WBC # BLD AUTO: 4.5 10E9/L (ref 4–11)

## 2019-06-13 PROCEDURE — 84443 ASSAY THYROID STIM HORMONE: CPT | Performed by: INTERNAL MEDICINE

## 2019-06-13 PROCEDURE — 36415 COLL VENOUS BLD VENIPUNCTURE: CPT | Performed by: INTERNAL MEDICINE

## 2019-06-13 PROCEDURE — 99214 OFFICE O/P EST MOD 30 MIN: CPT | Performed by: INTERNAL MEDICINE

## 2019-06-13 PROCEDURE — 93000 ELECTROCARDIOGRAM COMPLETE: CPT | Performed by: INTERNAL MEDICINE

## 2019-06-13 PROCEDURE — 80061 LIPID PANEL: CPT | Performed by: INTERNAL MEDICINE

## 2019-06-13 PROCEDURE — 85025 COMPLETE CBC W/AUTO DIFF WBC: CPT | Performed by: INTERNAL MEDICINE

## 2019-06-13 PROCEDURE — 80048 BASIC METABOLIC PNL TOTAL CA: CPT | Performed by: INTERNAL MEDICINE

## 2019-06-13 RX ORDER — FLECAINIDE ACETATE 50 MG/1
50 TABLET ORAL 2 TIMES DAILY
Qty: 200 TABLET | Refills: 5 | Status: SHIPPED | OUTPATIENT
Start: 2019-06-13 | End: 2020-03-12

## 2019-06-13 ASSESSMENT — MIFFLIN-ST. JEOR: SCORE: 1698.48

## 2019-06-13 NOTE — PATIENT INSTRUCTIONS
All your test results are reassuring.    No changes to medications.    Follow-up with Dr. Flanagan in 1 year.    Please establish care with a primary care provider.    If you have any questions or concerns, please contact my nurses at [phone].

## 2019-06-13 NOTE — PROGRESS NOTES
Clinic visit note dictated. Dictation reference number - [id number]        REVIEW OF SYSTEMS:  A comprehensive 10-point review of systems was completed and the pertinent positives are documented in the history of present illness.    Skin:  Negative     Eyes:  Negative    ENT:  Negative    Respiratory:  Negative    Cardiovascular:  Negative Positive for;palpitations;fatigue(Gets occasional palpitations when he forgets to take the flecainide)  Gastroenterology: Negative    Genitourinary:  not assessed    Musculoskeletal:  Negative    Neurologic:  Negative headaches  Psychiatric:  Negative    Heme/Lymph/Imm:  Positive for allergies  Endocrine:  Negative      CURRENT MEDICATIONS:  Current Outpatient Medications   Medication Sig Dispense Refill     clobetasol (TEMOVATE) 0.05 % cream APPLY 1 APPLICATION TO SKIN ONCE A DAY AS NEEDED (HAND DERMATITIS).  3     flecainide (TAMBOCOR) 50 MG tablet Take 1 tablet (50 mg) by mouth 2 times daily 200 tablet 5         ALLERGIES:  No Known Allergies    PAST MEDICAL HISTORY:    History reviewed. No pertinent past medical history.    PAST SURGICAL HISTORY:    History reviewed. No pertinent surgical history.    FAMILY HISTORY:    Family History   Problem Relation Age of Onset     Cancer Mother      Angina Father        SOCIAL HISTORY:    Social History     Socioeconomic History     Marital status:      Spouse name: None     Number of children: None     Years of education: None     Highest education level: None   Occupational History     None   Social Needs     Financial resource strain: None     Food insecurity:     Worry: None     Inability: None     Transportation needs:     Medical: None     Non-medical: None   Tobacco Use     Smoking status: Never Smoker     Smokeless tobacco: Never Used   Substance and Sexual Activity     Alcohol use: None     Drug use: No     Sexual activity: None   Lifestyle     Physical activity:     Days per week: None     Minutes per session: None      Stress: None   Relationships     Social connections:     Talks on phone: None     Gets together: None     Attends Yarsani service: None     Active member of club or organization: None     Attends meetings of clubs or organizations: None     Relationship status: None     Intimate partner violence:     Fear of current or ex partner: None     Emotionally abused: None     Physically abused: None     Forced sexual activity: None   Other Topics Concern     Parent/sibling w/ CABG, MI or angioplasty before 65F 55M? No   Social History Narrative     None       PHYSICAL EXAM:    Vitals: /78   Pulse 54   Ht 1.829 m (6')   Wt 85.5 kg (188 lb 9.6 oz)   SpO2 99%   BMI 25.58 kg/m    Wt Readings from Last 5 Encounters:   06/13/19 85.5 kg (188 lb 9.6 oz)   02/07/18 91.6 kg (202 lb)   12/18/17 87.1 kg (192 lb)   05/01/17 89.8 kg (198 lb)   04/06/17 88.9 kg (196 lb)       Constitutional: Comfortable at rest. Cooperative, alert and oriented, well developed, well nourished.  Eyes: Pupils equal and round, conjunctivae and lids unremarkable, sclera white, no xanthalasma,   ENT: Satisfactory dentition.  No cyanosis or pallor.  Neck: Carotid pulses are full and equal bilaterally, no carotid bruit, no thyromegaly     Respiratory: Normal respiratory effort with symmetrical chest wall movements and no use of accessory muscles. Good air entry with normal breath sounds and no rales or wheeze.  Cardiovascular: Normal jugular venous pulse and pressure.  Normal carotid pulse character and volume.  No carotid bruit.  Apical impulse is undisplaced and normal to palpation without parasternal heave or thrill.  Heart sounds are normal and regular. No audible murmur. No S3, S4 or friction rub.    GI: Soft, nontender, no hepatosplenomegaly, no masses, active bowel sounds.    Skin: No rash, erythema, ecchymosis.  Musculoskeletal: Normal muscle tone and strength. Normal gait. No spinal deformities.  Neuropsychiatric: Oriented to time place and  person.  Affect normal.  No gross motor deficits.  Extremities: No edema. No clubbing or deformities.        Encounter Diagnoses   Name Primary?     Palpitations Yes     History of Brian-Parkinson-White (WPW) syndrome      PAF (paroxysmal atrial fibrillation) (H)      Bicuspid aortic valve      Mild aortic regurgitation      Aortic root enlargement (H)        Orders Placed This Encounter   Procedures     Basic metabolic panel     Lipid Profile     Follow-Up with Cardiologist     EKG 12-lead complete w/read - Clinics (performed today)     EKG 12-lead complete w/read - Clinics     Holter Monitor 24 hour Adult Pediatric

## 2019-06-13 NOTE — PROGRESS NOTES
Service Date: 06/13/2019      LOCATION:  [facility], [facility].      PRIMARY CARE AND REFERRING PROVIDER:  Pete Pham DO, [place] [address].      REASON FOR VISIT:   1.  Scheduled annual followup of Brian-Parkinson-White syndrome and paroxysmal atrial fibrillation status post catheter ablation in 1992.   2.  Scheduled followup of bicuspid aortic valve disease.      PRIMARY CARDIOLOGIST:  Pascual Flanagan MD.      HISTORY OF PRESENT ILLNESS:    Robert Felix is a 63-year-old non-obese (BMI 25.6 kg/m2) Marshallese-speaking gentleman from [facility] who was unaccompanied today.  He speaks little English, and his daughter participated over the phone as a family member and .  Robert follows with Dr. Flanagan, but he is retiring from his job as an  at the end of this month and wanted to complete his yearly evaluation prior to care home.  Therefore, he is seeing me.      Medical history significant for:  1.  Brian-Parkinson-White syndrome.  He underwent successful catheter ablation in 1992.   2.  Paroxysmal atrial fibrillation.  On flecainide.  Low CHADS-VASc score, hence, not on anticoagulation.   3.  Known bicuspid aortic valve disease with fusion of the right and left coronary cusps, trace regurgitation, no significant stenosis.   4.  Mild aortic root enlargement of 4.0 cm.  Normal ascending aorta dimension of 3.5 cm.   5.  No known CAD.  Negative stress test in 2017 at 7 minutes.      Robert remains stable.  He rarely gets palpitations when he forgets to take his flecainide.  Otherwise, he denies any presyncope, syncope, chest pain, exertional dyspnea.  He leads an active life.  Nontobacco user.  Social alcohol intake.      DIAGNOSTIC DATA:   ECG done today shows sinus bradycardia of 55 BPM (on flecainide 50 mg b.i.d.).  WI is 200 milliseconds, QTc 440 milliseconds, QRS width 90 milliseconds.  He has no significant preexcitation on resting ECG.      Recent  transthoracic echocardiogram -- 2017.  I personally reviewed the images.  The aortic valve is bicuspid with fusion of the right and left coronary cusps.  Trace regurgitation, no significant stenosis with a mean systolic aortic valve gradient of 6 mmHg.  Stable ascending aorta dimension of 3.5 cm, mildly dilated aortic root of 4.0 cm, normal LV size and function, LVEF 60%-65%.      Labs -- Total cholesterol 207, HDL 42, , triglycerides 111, creatinine 1.1.  Hemoglobin 14.6.  TSH 1.67.      ASSESSMENT AND PLAN:     Sven is asymptomatic.  Echocardiogram confirms his stable bicuspid aortic valve disease and mild aortic root enlargement is stable at 4.0 cm.  Note that in the short axis view, the aortic valve almost appears trileaflet, but there is doming seen in the parasternal long axis view.  He does have fusion of the right and left coronary cusps, which is subtle but present.      1.  He has 4 children and only 1 of them has been screened with an echocardiogram.  Reiterated that his children and siblings are advised to have a 1-time screening echocardiogram due to the autosomal dominant inheritance pattern.   2.  His LDL is slightly elevated at 143 but in the absence of other comorbidities does not warrant statin lipid-lowering therapy.  I talked to him about lifestyle modification.   3.  It is not clear if the patient has a primary care provider.  Advised that he schedule a visit with one to follow up on his overall preventive care.     4.  Follow up with Dr. Flanagan's office in a year.  Since his BAV is mild and stable, I do not think he requires surveillance imaging every year, this could be moved to every 2 years.  This will be at the discretion of Dr. Flanagan.      It was my pleasure to see this nice gentleman.         HIPOLITO LAW MD             D: 2019   T: 2019   MT: JOSE ANTONIO      Name:     SVEN MESA   MRN:      [id number]        Account:      [id number]   :       1955           Service Date: 06/13/2019      Document: [id number]

## 2019-06-13 NOTE — LETTER
6/13/2019    Pete Pham MD  [facility] [address]  [facility] [address]    RE: Robert Elvira       Dear Colleague,    I had the pleasure of seeing Robert Elvira in the [facility].    Clinic visit note dictated. Dictation reference number - [id number]        REVIEW OF SYSTEMS:  A comprehensive 10-point review of systems was completed and the pertinent positives are documented in the history of present illness.    Skin:  Negative     Eyes:  Negative    ENT:  Negative    Respiratory:  Negative    Cardiovascular:  Negative Positive for;palpitations;fatigue(Gets occasional palpitations when he forgets to take the flecainide)  Gastroenterology: Negative    Genitourinary:  not assessed    Musculoskeletal:  Negative    Neurologic:  Negative headaches  Psychiatric:  Negative    Heme/Lymph/Imm:  Positive for allergies  Endocrine:  Negative      CURRENT MEDICATIONS:  Current Outpatient Medications   Medication Sig Dispense Refill     clobetasol (TEMOVATE) 0.05 % cream APPLY 1 APPLICATION TO SKIN ONCE A DAY AS NEEDED (HAND DERMATITIS).  3     flecainide (TAMBOCOR) 50 MG tablet Take 1 tablet (50 mg) by mouth 2 times daily 200 tablet 5         ALLERGIES:  No Known Allergies    PAST MEDICAL HISTORY:    History reviewed. No pertinent past medical history.    PAST SURGICAL HISTORY:    History reviewed. No pertinent surgical history.    FAMILY HISTORY:    Family History   Problem Relation Age of Onset     Cancer Mother      Angina Father        SOCIAL HISTORY:    Social History     Socioeconomic History     Marital status:      Spouse name: None     Number of children: None     Years of education: None     Highest education level: None   Occupational History     None   Social Needs     Financial resource strain: None     Food insecurity:     Worry: None     Inability: None     Transportation needs:     Medical: None     Non-medical: None   Tobacco Use      Smoking status: Never Smoker     Smokeless tobacco: Never Used   Substance and Sexual Activity     Alcohol use: None     Drug use: No     Sexual activity: None   Lifestyle     Physical activity:     Days per week: None     Minutes per session: None     Stress: None   Relationships     Social connections:     Talks on phone: None     Gets together: None     Attends Bahai service: None     Active member of club or organization: None     Attends meetings of clubs or organizations: None     Relationship status: None     Intimate partner violence:     Fear of current or ex partner: None     Emotionally abused: None     Physically abused: None     Forced sexual activity: None   Other Topics Concern     Parent/sibling w/ CABG, MI or angioplasty before 65F 55M? No   Social History Narrative     None       PHYSICAL EXAM:    Vitals: /78   Pulse 54   Ht 1.829 m (6')   Wt 85.5 kg (188 lb 9.6 oz)   SpO2 99%   BMI 25.58 kg/m     Wt Readings from Last 5 Encounters:   06/13/19 85.5 kg (188 lb 9.6 oz)   02/07/18 91.6 kg (202 lb)   12/18/17 87.1 kg (192 lb)   05/01/17 89.8 kg (198 lb)   04/06/17 88.9 kg (196 lb)       Constitutional: Comfortable at rest. Cooperative, alert and oriented, well developed, well nourished.  Eyes: Pupils equal and round, conjunctivae and lids unremarkable, sclera white, no xanthalasma,   ENT: Satisfactory dentition.  No cyanosis or pallor.  Neck: Carotid pulses are full and equal bilaterally, no carotid bruit, no thyromegaly     Respiratory: Normal respiratory effort with symmetrical chest wall movements and no use of accessory muscles. Good air entry with normal breath sounds and no rales or wheeze.  Cardiovascular: Normal jugular venous pulse and pressure.  Normal carotid pulse character and volume.  No carotid bruit.  Apical impulse is undisplaced and normal to palpation without parasternal heave or thrill.  Heart sounds are normal and regular. No audible murmur. No S3, S4 or friction  rub.    GI: Soft, nontender, no hepatosplenomegaly, no masses, active bowel sounds.    Skin: No rash, erythema, ecchymosis.  Musculoskeletal: Normal muscle tone and strength. Normal gait. No spinal deformities.  Neuropsychiatric: Oriented to time place and person.  Affect normal.  No gross motor deficits.  Extremities: No edema. No clubbing or deformities.        Encounter Diagnoses   Name Primary?     Palpitations Yes     History of Brian-Parkinson-White (WPW) syndrome      PAF (paroxysmal atrial fibrillation) (H)      Bicuspid aortic valve      Mild aortic regurgitation      Aortic root enlargement (H)        Orders Placed This Encounter   Procedures     Basic metabolic panel     Lipid Profile     Follow-Up with Cardiologist     EKG 12-lead complete w/read - Clinics (performed today)     EKG 12-lead complete w/read - Clinics     Holter Monitor 24 hour Adult Pediatric       Thank you for allowing me to participate in the care of your patient.      Sincerely,     Madi Brown MD     [facility]    cc:   Pete Pham MD  [facility]  [address] [place] [address]

## 2019-06-13 NOTE — LETTER
6/13/2019      Pete Pham MD  [facility] [address]  [facility] [address]      RE: Robert Felix       Dear Colleague,    I had the pleasure of seeing Robert Felix in the [facility].    Service Date: 06/13/2019      LOCATION:  [facility], [facility].      PRIMARY CARE AND REFERRING PROVIDER:  Pete Pham DO, [place] [address].      REASON FOR VISIT:   1.  Scheduled annual followup of Brian-Parkinson-White syndrome and paroxysmal atrial fibrillation status post catheter ablation in 1992.   2.  Scheduled followup of bicuspid aortic valve disease.      PRIMARY CARDIOLOGIST:  Pascual Flanagan MD.      HISTORY OF PRESENT ILLNESS:  Robert Felix is a 63-year-old non-obese (BMI 25.6 kg/m2) [place]-speaking gentleman from [facility] who was unaccompanied today.  He speaks little English, and his daughter participated over the phone as a family member and .  Robert follows with Dr. Flanagan, but he is retiring from his job as an  at the end of this month and wanted to complete his yearly evaluation prior to correction.  Therefore, he is seeing me.      PAST MEDICAL HISTORY:  He does not have significant medical comorbidities other than:   1.  Brian-Parkinson-White syndrome.  He underwent successful catheter ablation in 1992.   2.  Paroxysmal atrial fibrillation.  Optimally treated on flecainide after the ablation.  Low CHADS-VASc score, hence, not on anticoagulation.   3.  Known bicuspid aortic valve disease with fusion of the right and left coronary cusps, trace regurgitation, no significant stenosis.   4.  Mild aortic root enlargement of 4.0 cm.  Normal ascending aorta dimension of 3.5 cm.   5.  No known CAD.  Negative stress test in 2017 at 7 minutes.      Robert remains stable.  He rarely gets palpitations when he forgets to take his flecainide.  Otherwise, he denies any presyncope,  syncope, chest pain, exertional dyspnea.  He leads an active life.  Nontobacco user.  Social alcohol intake.      DIAGNOSTIC DATA:   ECG done today shows sinus bradycardia of 55 BPM (on flecainide 50 mg b.i.d.).  AL is 200 milliseconds, QTc 440 milliseconds, QRS width 90 milliseconds.  He has no significant preexcitation on resting ECG.      Recent transthoracic echocardiogram -- 06/05/2017.  I personally reviewed the images.  The aortic valve is bicuspid with fusion of the right and left coronary cusps.  Trace regurgitation, no significant stenosis with a mean systolic aortic valve gradient of 6 mmHg.  Stable ascending aorta dimension of 3.5 cm, mildly dilated aortic root of 4.0 cm, normal LV size and function, LVEF 60%-65%.      Labs -- Total cholesterol 207, HDL 42, , triglycerides 111, creatinine 1.1.  Hemoglobin 14.6.  TSH 1.67.      ASSESSMENT AND PLAN:     Robert is symptomatically stable.  Echocardiogram confirms that his stable bicuspid aortic valve disease and mild aortic root enlargement is stable at 4.0 cm.  Note that in the short axis view, the aortic valve almost appears trileaflet, but there is doming seen in the parasternal long axis view.  He does have fusion of the right and left coronary cusps, which is subtle but present.  He has 4 children and only 1 of them has been screened with an echocardiogram.  Reiterated advice that all children are advised to have a 1-time screening echocardiogram due to the autosomal dominant inheritance pattern.      His LDL is slightly elevated at 143 but in the absence of other comorbidities does not warrant statin lipid-lowering therapy.  I talked to him about lifestyle modification.      It is not clear if the patient has a primary care provider.  Reiterated that he schedule a visit with one to follow up on his overall preventive care.        Follow up with Dr. Flanagan's office in a year.  Since his BAV is mild and stable, I do not think he requires  surveillance imaging every year, this could be moved to every 2 years.  This will be at the discretion of Dr. Flanagan.      It was my pleasure to see this nice gentleman.         MADI BROWN MD             D: 2019   T: 2019   MT: JOSE ANTONIO      Name:     SVEN MESA   MRN:      [id number]        Account:      [id number]   :      1955           Service Date: 2019      Document: [id number]         Outpatient Encounter Medications as of 2019   Medication Sig Dispense Refill     clobetasol (TEMOVATE) 0.05 % cream APPLY 1 APPLICATION TO SKIN ONCE A DAY AS NEEDED (HAND DERMATITIS).  3     flecainide (TAMBOCOR) 50 MG tablet Take 1 tablet (50 mg) by mouth 2 times daily 200 tablet 5     [DISCONTINUED] flecainide (TAMBOCOR) 50 MG tablet Take 1 tablet (50 mg) by mouth 2 times daily 180 tablet 0     No facility-administered encounter medications on file as of 2019.        Again, thank you for allowing me to participate in the care of your patient.      Sincerely,    Madi Brown MD     [facility]

## 2020-02-24 ENCOUNTER — TELEPHONE (OUTPATIENT)
Dept: CARDIOLOGY | Facility: CLINIC | Age: 65
End: 2020-02-24

## 2020-02-24 NOTE — TELEPHONE ENCOUNTER
Overhead page received from patient's daughter, stating that the patient needs to make an OV with Dr. Flanagan for his symptoms. Patient's daughter states that the patient has been having more frequent episodes of atrial fibrillation in the past month. Patient's daughter states that his episodes will last 30 minutes to a hour at a time. Patient states that the patient currently is in [place], but wants to fly back for an appointment. Patient is requesting a cardiologist only, not an KARO. Patient is currently asymptomatic. Advised daughter that if patient has another episode of prolonged atrial fibrillation, he needs to go to the ER in [place]. Patient's daughter verbalized understanding and agreed with plan of care. Patient's daughter connected to scheduling to set up next available cardiology appointment.

## 2020-03-12 ENCOUNTER — OFFICE VISIT (OUTPATIENT)
Dept: CARDIOLOGY | Facility: CLINIC | Age: 65
End: 2020-03-12
Payer: MEDICAID

## 2020-03-12 VITALS
WEIGHT: 190 LBS | HEIGHT: 72 IN | BODY MASS INDEX: 25.73 KG/M2 | HEART RATE: 90 BPM | DIASTOLIC BLOOD PRESSURE: 78 MMHG | OXYGEN SATURATION: 97 % | SYSTOLIC BLOOD PRESSURE: 132 MMHG

## 2020-03-12 DIAGNOSIS — I48.0 PAROXYSMAL ATRIAL FIBRILLATION (H): ICD-10-CM

## 2020-03-12 DIAGNOSIS — I48.0 PAROXYSMAL ATRIAL FIBRILLATION (H): Primary | ICD-10-CM

## 2020-03-12 PROCEDURE — 93000 ELECTROCARDIOGRAM COMPLETE: CPT | Performed by: INTERNAL MEDICINE

## 2020-03-12 PROCEDURE — 99215 OFFICE O/P EST HI 40 MIN: CPT | Performed by: INTERNAL MEDICINE

## 2020-03-12 PROCEDURE — T1013 SIGN LANG/ORAL INTERPRETER: HCPCS | Mod: U3 | Performed by: INTERNAL MEDICINE

## 2020-03-12 RX ORDER — METOPROLOL SUCCINATE 25 MG/1
12.5 TABLET, EXTENDED RELEASE ORAL DAILY
Qty: 90 TABLET | Refills: 3 | Status: SHIPPED | OUTPATIENT
Start: 2020-03-12 | End: 2020-09-02

## 2020-03-12 RX ORDER — METOPROLOL SUCCINATE 25 MG/1
12.5 TABLET, EXTENDED RELEASE ORAL DAILY
Qty: 90 TABLET | Refills: 3 | Status: SHIPPED | OUTPATIENT
Start: 2020-03-12 | End: 2020-03-12

## 2020-03-12 RX ORDER — FLECAINIDE ACETATE 50 MG/1
100 TABLET ORAL 2 TIMES DAILY
Qty: 200 TABLET | Refills: 5 | Status: SHIPPED | OUTPATIENT
Start: 2020-03-12 | End: 2020-03-19

## 2020-03-12 RX ORDER — FLECAINIDE ACETATE 50 MG/1
100 TABLET ORAL 2 TIMES DAILY
Qty: 200 TABLET | Refills: 5 | Status: SHIPPED | OUTPATIENT
Start: 2020-03-12 | End: 2020-03-12

## 2020-03-12 ASSESSMENT — MIFFLIN-ST. JEOR: SCORE: 1699.96

## 2020-03-12 NOTE — PROGRESS NOTES
Service Date: 03/12/2020      CONSULT INDICATION:  Atrial fibrillation.      HISTORY OF PRESENT ILLNESS:  I had the opportunity to see patient, Jairon Felix, today in Cardiology Clinic for followup.  It is mentioned in his chart that he prefers to speak through a Chinese , however, today he declines this as his daughter, who is a nurse, accompanies him.        He is a 64-year-old male with a past medical history significant for Brian-Parkinson-White syndrome, status post successful catheter ablation in 1992, paroxysmal atrial fibrillation, bicuspid aortic valve, mild aortic root enlargement at 4 cm, who presents for further evaluation and management of palpitations.        The patient was previously seen by Dr. Flanagan, and on 12/18/2017, he was started on a flecainide 50 mg b.i.d.  He subsequently underwent an exercise ECG stress test that did not show any widening of his QRS complex.  He has been maintained on flecainide 50 mg b.i.d. since then and has been doing well.      However, in 12/2019, for some unclear reason he stopped taking the flecainide and since then he has had a generalized feeling of unwell with a vague chest discomfort.  This chest discomfort seems to be associated with palpitations, however, even when his heart rate is not elevated, he notes a generalized feeling of unwell, associated with irregular heartbeat with a normal rate.  He denies any exertional pain or pressure.  No abnormal shortness of breath with exertion or at rest.        He was last seen in Cardiology Clinic in 06/13/2019 with Dr. Brown.  At that time, he was found to be in stable cardiovascular health and no changes were warranted to his cardiac regimen.      ASSESSMENT AND RECOMMENDATIONS:       1.  Paroxysmal atrial fibrillation. [id number] of 0.  Was on flecainide 50 mg b.i.d. started 12/2017, however, patient self-discontinued this 12/2019, and since then he has been in symptomatic atrial fibrillation.    2.  Brian-Parkinson-White syndrome, status post successful catheter ablation in .   3.  Bicuspid aortic valve.  Family screening discussed at prior visit per chart review.   4.  Mild aortic root enlargement 4 cm.      I discussed with the patient and his daughter at length the patient's diagnosis of atrial fibrillation and options moving forward.  Since he restarted his flecainide 50 mg b.i.d., he has not had relief of his palpitations and remains in atrial fibrillation.  I offered the option of electrical cardioversion after a period of anticoagulation, however, he declines at this time since he had effective rhythm control previously, and it seems like he was more keen on staying with flecainide.        As such, we will start low-dose beta blocker and increase his flecainide dosage with the plan for ECG treadmill test in 5 days to evaluate the QRS response.  We will have him follow up with EP, as he is open to discussing ablation due to his significant symptoms even with rate controlled atrial fibrillation.  He has a history of Brian-Parkinson-White, for which he underwent a successful ablation in the past.      Thank you for allowing our team to participate in the care of his patient, Jairon Felix.  Please do not hesitate to page or call me anytime with questions or concerns.       Jose Browne MD, [facility]  Cardiology  Pager:  [phone]  Text Page   2020       cc:      Pete Pham DO   [facility]   [address]   [place] [address]         D: 2020   T: 2020   MT: MARILEE      Name:     SVEN FELIX   MRN:      [id number]        Account:      [id number]   :      1955           Service Date: 2020      Document: [id number]

## 2020-03-12 NOTE — TELEPHONE ENCOUNTER
Medication Refilled: flecainide and metoprolol  Last office visit: 3/12/2020  Pharmacy sent to: [facility], Pt's daughter called and stated the medication needed to be changed as the pharmacy the medications originally sent to is too expensive.   CHERELLE Connell RN

## 2020-03-12 NOTE — PROGRESS NOTES
"    Cardiology Clinic Progress Note:    March 12, 2020   Patient Name: Robert Felix  Patient MRN: [id number]     Consult reason: atrial fibrillation    HPI and Assessment and Plan/Recommendations:    Please see dictation. #[id number]    ECG 3/12/2020 atrial fibrillation rate 87 bpm, pause approx 1.4s     Thank you for allowing our team to participate in the care of Robert Felix.  Please do not hesitate to call or page me with any questions or concerns.    Sincerely,     Jose Browne MD, [facility]  Cardiology  Pager:  [phone]  Text Page   March 12, 2020    Past Medical History:   WPW s/p ablation 1990s  Atrial fibrillation    Past Surgical History:   No prior cardiac surgical history    Medications (outpatient):  Current Outpatient Medications   Medication Sig Dispense Refill     clobetasol (TEMOVATE) 0.05 % cream APPLY 1 APPLICATION TO SKIN ONCE A DAY AS NEEDED (HAND DERMATITIS).  3     flecainide (TAMBOCOR) 50 MG tablet Take 1 tablet (50 mg) by mouth 2 times daily 200 tablet 5       Allergies:  No Known Allergies    Social History:   History   Drug Use No      History   Smoking Status     Never Smoker   Smokeless Tobacco     Never Used     Social History    Substance and Sexual Activity      Alcohol use: Not on file       Family History:  Family History   Problem Relation Age of Onset     Cancer Mother      Angina Father        Review of Systems:   A complete review of systems was negative except as mentioned in the History of Present Illness.     Objective & Physical Exam:  Ht 1.829 m (6' 0.01\")   BMI 25.57 kg/m    Wt Readings from Last 2 Encounters:   06/13/19 85.5 kg (188 lb 9.6 oz)   02/07/18 91.6 kg (202 lb)     Body mass index is 25.57 kg/m .   Body surface area is 2.08 meters squared.    Constitutional: appears stated age, in no apparent distress, appears to be well nourished  Eyes: sclera anicteric, conjunctiva normal, no lesions on eyelids or lashes  ENT: normocephalic, without " obvious abnormality, atraumatic, external ears without lesions   Pulmonary: clear to auscultation bilaterally, no wheezes, no rales, no increased work of breathing  Cardiovascular: JVP normal, tachycardic, irregular rhythm, no murmur appreciated, no lower extremity edema  Gastrointestinal: abdominal exam benign, non-tender, no rigidity, no guarding  Neurologic: awake, alert, face symmetrical, moves all extremities  Skin: no abnormal rashes or lesions on limited exam, nails normal without discoloration or clubbing, no jaundice  Psychiatric: affect is normal, answers questions appropriately, oriented to self and place    Labs reviewed:  Lab Results   Component Value Date    WBC 4.5 06/13/2019    RBC 4.57 06/13/2019    HGB 14.6 06/13/2019    HCT 41.5 06/13/2019    MCV 91 06/13/2019    MCH 31.9 06/13/2019    MCHC 35.2 06/13/2019    RDW 12.6 06/13/2019     06/13/2019     Sodium   Date Value Ref Range Status   06/13/2019 139 136 - 145 mmol/L Final     Potassium   Date Value Ref Range Status   06/13/2019 4.0 3.5 - 5.1 mmol/L Final     Chloride   Date Value Ref Range Status   06/13/2019 105 98 - 107 mmol/L Final     Carbon Dioxide   Date Value Ref Range Status   06/13/2019 27 23 - 29 mmol/L Final     Anion Gap   Date Value Ref Range Status   06/13/2019 11 6 - 17 mmol/L Final     Glucose   Date Value Ref Range Status   06/13/2019 114 (H) 70 - 105 mg/dL Final     Urea Nitrogen   Date Value Ref Range Status   06/13/2019 22 7 - 30 mg/dL Final     Creatinine   Date Value Ref Range Status   06/13/2019 1.10 0.70 - 1.30 mg/dL Final     GFR Estimate   Date Value Ref Range Status   06/13/2019 68 >60 mL/min/[1.73_m2] Final     Calcium   Date Value Ref Range Status   06/13/2019 9.5 8.5 - 10.5 mg/dL Final     Recent Labs   Lab Test 06/13/19  0851   CHOL 207*   HDL 42   *   TRIG 111      No results found for: A1C

## 2020-03-12 NOTE — LETTER
"3/12/2020    Pete Pham MD  [facility] [address]  [facility] [address]    RE: Robert Felix       Dear Colleague,    I had the pleasure of seeing Robert Felix in the [facility].        Cardiology Clinic Progress Note:    March 12, 2020   Patient Name: Robert Felix  Patient MRN: [id number]     Consult reason: atrial fibrillation    HPI and Assessment and Plan/Recommendations:    Please see dictation. #[id number]    ECG 3/12/2020 atrial fibrillation rate 87 bpm, pause approx 1.4s     Thank you for allowing our team to participate in the care of Robert Felix.  Please do not hesitate to call or page me with any questions or concerns.    Sincerely,     Jose Browne MD, [facility]  Cardiology  Pager:  [phone]  Text Page   March 12, 2020    Past Medical History:   WPW s/p ablation 1990s  Atrial fibrillation    Past Surgical History:   No prior cardiac surgical history    Medications (outpatient):  Current Outpatient Medications   Medication Sig Dispense Refill     clobetasol (TEMOVATE) 0.05 % cream APPLY 1 APPLICATION TO SKIN ONCE A DAY AS NEEDED (HAND DERMATITIS).  3     flecainide (TAMBOCOR) 50 MG tablet Take 1 tablet (50 mg) by mouth 2 times daily 200 tablet 5       Allergies:  No Known Allergies    Social History:   History   Drug Use No      History   Smoking Status     Never Smoker   Smokeless Tobacco     Never Used     Social History    Substance and Sexual Activity      Alcohol use: Not on file       Family History:  Family History   Problem Relation Age of Onset     Cancer Mother      Angina Father        Review of Systems:   A complete review of systems was negative except as mentioned in the History of Present Illness.     Objective & Physical Exam:  Ht 1.829 m (6' 0.01\")   BMI 25.57 kg/m    Wt Readings from Last 2 Encounters:   06/13/19 85.5 kg (188 lb 9.6 oz)   02/07/18 91.6 kg (202 lb)     Body mass " index is 25.57 kg/m .   Body surface area is 2.08 meters squared.    Constitutional: appears stated age, in no apparent distress, appears to be well nourished  Eyes: sclera anicteric, conjunctiva normal, no lesions on eyelids or lashes  ENT: normocephalic, without obvious abnormality, atraumatic, external ears without lesions   Pulmonary: clear to auscultation bilaterally, no wheezes, no rales, no increased work of breathing  Cardiovascular: JVP normal, tachycardic, irregular rhythm, no murmur appreciated, no lower extremity edema  Gastrointestinal: abdominal exam benign, non-tender, no rigidity, no guarding  Neurologic: awake, alert, face symmetrical, moves all extremities  Skin: no abnormal rashes or lesions on limited exam, nails normal without discoloration or clubbing, no jaundice  Psychiatric: affect is normal, answers questions appropriately, oriented to self and place    Labs reviewed:  Lab Results   Component Value Date    WBC 4.5 06/13/2019    RBC 4.57 06/13/2019    HGB 14.6 06/13/2019    HCT 41.5 06/13/2019    MCV 91 06/13/2019    MCH 31.9 06/13/2019    MCHC 35.2 06/13/2019    RDW 12.6 06/13/2019     06/13/2019     Sodium   Date Value Ref Range Status   06/13/2019 139 136 - 145 mmol/L Final     Potassium   Date Value Ref Range Status   06/13/2019 4.0 3.5 - 5.1 mmol/L Final     Chloride   Date Value Ref Range Status   06/13/2019 105 98 - 107 mmol/L Final     Carbon Dioxide   Date Value Ref Range Status   06/13/2019 27 23 - 29 mmol/L Final     Anion Gap   Date Value Ref Range Status   06/13/2019 11 6 - 17 mmol/L Final     Glucose   Date Value Ref Range Status   06/13/2019 114 (H) 70 - 105 mg/dL Final     Urea Nitrogen   Date Value Ref Range Status   06/13/2019 22 7 - 30 mg/dL Final     Creatinine   Date Value Ref Range Status   06/13/2019 1.10 0.70 - 1.30 mg/dL Final     GFR Estimate   Date Value Ref Range Status   06/13/2019 68 >60 mL/min/[1.73_m2] Final     Calcium   Date Value Ref Range Status    06/13/2019 9.5 8.5 - 10.5 mg/dL Final     Recent Labs   Lab Test 06/13/19  0851   CHOL 207*   HDL 42   *   TRIG 111      No results found for: A1C         Service Date: 03/12/2020      CONSULT INDICATION:  Atrial fibrillation.      HISTORY OF PRESENT ILLNESS:  I had the opportunity to see patient, Jairon Felix, today in Cardiology Clinic for followup.  It is mentioned in his chart that he prefers to speak through a Brazilian , however, today he declines this as his daughter, who is a nurse, accompanies him.        He is a 64-year-old male with a past medical history significant for Brian-Parkinson-White syndrome, status post successful catheter ablation in 1992, paroxysmal atrial fibrillation, bicuspid aortic valve, mild aortic root enlargement at 4 cm, who presents for further evaluation and management of palpitations.        The patient was previously seen by Dr. Flanagan, and on 12/18/2017, he was started on a flecainide 50 mg b.i.d.  He subsequently underwent an exercise ECG stress test that did not show any widening of his QRS complex.  He has been maintained on flecainide 50 mg b.i.d. since then and has been doing well.      However, in 12/2019, for some unclear reason he stopped taking the flecainide and since then he has had a generalized feeling of unwell with a vague chest discomfort.  This chest discomfort seems to be associated with palpitations, however, even when his heart rate is not elevated, he notes a generalized feeling of unwell, associated with irregular heartbeat with a normal rate.  He denies any exertional pain or pressure.  No abnormal shortness of breath with exertion or at rest.        He was last seen in Cardiology Clinic in 06/13/2019 with Dr. Brown.  At that time, he was found to be in stable cardiovascular health and no changes were warranted to his cardiac regimen.      ASSESSMENT AND RECOMMENDATIONS:       1.  Paroxysmal atrial fibrillation. [id number] of 0.   Was on flecainide 50 mg b.i.d. started 2017, however, patient self-discontinued this 2019, and since then he has been in symptomatic atrial fibrillation.   2.  Brian-Parkinson-White syndrome, status post successful catheter ablation in .   3.  Bicuspid aortic valve.  Family screening discussed at prior visit per chart review.   4.  Mild aortic root enlargement 4 cm.      I discussed with the patient and his daughter at length the patient's diagnosis of atrial fibrillation and options moving forward.  Since he restarted his flecainide 50 mg b.i.d., he has not had relief of his palpitations and remains in atrial fibrillation.  I offered the option of electrical cardioversion after a period of anticoagulation, however, he declines at this time since he had effective rhythm control previously, and it seems like he was more keen on staying with flecainide.        As such, we will start low-dose beta blocker and increase his flecainide dosage with the plan for ECG treadmill test in 5 days to evaluate the QRS response.  We will have him follow up with EP, as he is open to discussing ablation due to his significant symptoms even with rate controlled atrial fibrillation.  He has a history of Brian-Parkinson-White, for which he underwent a successful ablation in the past.      Thank you for allowing our team to participate in the care of his patient, Jairon Felix.  Please do not hesitate to page or call me anytime with questions or concerns.       Jose Browne MD, [facility]  Cardiology  Pager:  [phone]  Text Page   2020       cc:      Pete Pham DO   [facility]   [address]   [place] [address]         D: 2020   T: 2020   MT: MARILEE      Name:     SVEN FELIX   MRN:      [id number]        Account:      [id number]   :      1955           Service Date: 2020      Document: [id number]        Thank you  for allowing me to participate in the care of your patient.      Sincerely,     Jose Browne MD     [facility]

## 2020-03-12 NOTE — PATIENT INSTRUCTIONS
March 12, 2020    Thank you for allowing our Cardiology team to participate in your care.     Please note the following changes to your heart treatment plan:     Medication changes:   - increase flecainide to 100mg twice a day  - start metoprolol succinate 12.5mg daily    Tests to be done:  - exercise ECG stress test in 5-6 days (ideally 3/18/2020)    Follow up:  - Follow up in 2 weeks, or sooner as needed.      Please contact our team at [phone] for any questions or concerns.   If you are having a medical emergency, please call 911.       Sincerely,    Jose Browne MD  Cardiology    [facility] [place]

## 2020-03-17 ENCOUNTER — TELEPHONE (OUTPATIENT)
Dept: CARDIOLOGY | Facility: CLINIC | Age: 65
End: 2020-03-17

## 2020-03-17 NOTE — TELEPHONE ENCOUNTER
RN called patient and reviewed screening tool with him. RN utilized [employer]  to do the screening tool.       Wellness Screening Tool    Symptom Screening:    Do you have a:    Fever?  no    Cough? no    Shortness of breath?  no (if yes, is this a change?)    Skin rash?  no      Within the past 14 days, have you been in contact with someone who:    Is currently being ruled out for COVID-19 (novel coronavirus)?  no    Has tested positive for COVID-19?  no    Has symptoms of a respiratory illness (fever, cough, shortness of breath)?  no    Have you or someone you have been in contact with traveled to an area with COVID-19:    Refer to the CDC Coronavirus webpage for COVID-19 areas: no (if yes, what country?)    Within the past 3 weeks, have you been exposed to the following:      Pertussis?  no    Chicken pox? no    Measles? no    Patient's appointment status: patient will be seen in clinic as scheduled.

## 2020-03-18 ENCOUNTER — TELEPHONE (OUTPATIENT)
Dept: CARDIOLOGY | Facility: CLINIC | Age: 65
End: 2020-03-18

## 2020-03-18 ENCOUNTER — HOSPITAL ENCOUNTER (OUTPATIENT)
Dept: CARDIOLOGY | Facility: CLINIC | Age: 65
Discharge: HOME | End: 2020-03-18
Attending: INTERNAL MEDICINE | Admitting: INTERNAL MEDICINE
Payer: MEDICAID

## 2020-03-18 DIAGNOSIS — I48.0 PAROXYSMAL ATRIAL FIBRILLATION (H): ICD-10-CM

## 2020-03-18 DIAGNOSIS — E78.5 HYPERLIPIDEMIA: Primary | ICD-10-CM

## 2020-03-18 PROCEDURE — 93017 CV STRESS TEST TRACING ONLY: CPT

## 2020-03-18 PROCEDURE — 93016 CV STRESS TEST SUPVJ ONLY: CPT | Performed by: INTERNAL MEDICINE

## 2020-03-18 PROCEDURE — 93018 CV STRESS TEST I&R ONLY: CPT | Performed by: INTERNAL MEDICINE

## 2020-03-18 NOTE — TELEPHONE ENCOUNTER
Received a call from Tyra, patient's daughter. To note: We do not have consent to communicate.     Tyra stated that patient funk in [place] and would like to go back to [place] to be with his wife. Patient is concerned about the traveling situation with COVID-19.     Patient is wondering if he can go home and see Dr. Ledezma this summer, or if he really needs to see Dr. Ledezma before he goes home. Patient is also wondering if he should establish care with EP/Cardiology in [place].     Will review this with Dr. Browne.     Oscar RN, BSN  [facility]

## 2020-03-19 ENCOUNTER — TELEPHONE (OUTPATIENT)
Dept: CARDIOLOGY | Facility: CLINIC | Age: 65
End: 2020-03-19

## 2020-03-19 RX ORDER — FLECAINIDE ACETATE 50 MG/1
75 TABLET ORAL 2 TIMES DAILY
Qty: 200 TABLET | Refills: 5 | COMMUNITY
Start: 2020-03-19 | End: 2020-05-22

## 2020-03-19 NOTE — TELEPHONE ENCOUNTER
Last OV W/Dr. Browne 3/12/2020 for management of palpitations in pt with history of Brian-Parkinson-White syndrome, status post successful catheter ablation in 1992, paroxysmal atrial fibrillation, bicuspid aortic valve, mild aortic root enlargement at 4 cm.     Pt has been maintained on flecainide since 2017 without complication. For unknown reasoning pt self discontinued the use of flecainide in 12/2019. Since this time pt has noted increase in palpitations and general feeling of being unwell.    Pt restarted on flecainide 50mg BID and started on metoprolol succinate 25mg daily. Pt to have stress test in 5 days to evaluate QRS on flecainide.     Stress test completed 3/18/2020:     ----- Message from Jose Browne MD sent at 3/18/2020  3:04 PM CDT -----  Results reviewed, QRS at rest is 100ms, QRS at peak exercise 120ms (17% change) which is acceptable, however he is now in NSR with 1st degree AVB.   Since he only converted back into atrial fibrillation after self-discontinuing flecainide 50mg BID (which was an effective dose previously when he was taking this regularly), I recommend that we switch back to flecainide 50mg BID, and continue metoprolol succinate 12.5mg daily.     Evelin please call him and if he is feeling fine now that he is in NSR, and no longer wishes to pursue atrial fibrillation ablation, he does NOT need to see Dr. Ledezma on 3/26 as scheduled. He should follow up with me in 6 months with a 7 day ZioPatch beforehand.     Cc'd Dr. Ledezma as an FYI.   Thank you!      Call placed to pt 3/19/2020 - left  awaiting call back.

## 2020-03-19 NOTE — TELEPHONE ENCOUNTER
Jose Browne MD  You 14 minutes ago (11:18 AM)       As mentioned earlier, since he is in normal sinus rhythm and has a 1st degree AV block, I recommend decreasing flecainide to 50mg BID.      --------------------------------------------------------------------------------------------------------------------------    Reviewed recommendations with Tyra. I recommended she call the clinic if patient is concerned that he is back in Afib. Provided phone number for Dr. Browne's RN.     Oscar RN, BSN  [facility]

## 2020-03-19 NOTE — TELEPHONE ENCOUNTER
Jose Browne MD Garbers, Trang, RN    Caller: Unspecified (Yesterday,  2:38 PM)               Copied result note---     Notes recorded by Jose Browne MD on 3/18/2020 at 3:04 PM CDT   Results reviewed, QRS at rest is 100ms, QRS at peak exercise 120ms (17% change) which is acceptable, however he is now in NSR with 1st degree AVB.   Since he only converted back into atrial fibrillation after self-discontinuing flecainide 50mg BID (which was an effective dose previously when he was taking this regularly), I recommend that we switch back to flecainide 50mg BID, and continue metoprolol succinate 12.5mg daily.     Please call him and if he is feeling fine now that he is in NSR, and no longer wishes to pursue atrial fibrillation ablation, he does NOT need to see Dr. Ledezma on 3/26 as scheduled. He should follow up with me in 6 months with a 7 day ZioPatch beforehand.     Cc'd Dr. Ledezma as an FYNEL.   Thank you!      -------------------------------------------------------------------------------------------     Reviewed chart, we do not have a consent to talk to Tyra.     I spoke with patient. Patient gave a verbal consent to let us speak with Tyra. Patient is currently at the airport and is flying back to [place].     Spoke with Tyra. Tyra stated that patient has completed the consent to communicate form multiple times. Reviewed Dr. Browne's recommendations.  Tyra stated that in Dec/Carlos patient skipped a few doses of Flecainide. Mid January, patient noticed this and since then patient has been taking his Flecainide regularly. A month later, patient started to have episodes of Afib symptoms. Patient promptly took a flight here to [place] to f/u with Dr. Browne on 03-.     Tyra thinks that Flecainide 50 mg BID was not sufficient and would prefer patient stay on 100 mg BID.      Will review Flecainide dosing with Dr. Browne.     EP appt cancelled.     Placed orders for 6 month f/u and Zio Patch. Also added FLP (pt due for annual  testing)      Oscar RN, BSN  [facility]

## 2020-03-22 ENCOUNTER — TELEPHONE (OUTPATIENT)
Facility: CLINIC | Age: 65
End: 2020-03-22

## 2020-03-22 NOTE — TELEPHONE ENCOUNTER
Cardiology telephone note:    I was alerted that the patient's daughter Abbey wanted to discuss her father's care, and so I called her but there was no answer. Called the patient who is in [place] currently, he states that he feels back to normal, as he did 2 months ago, has no complaints or concerns. Called his other daughter, Tyra, and discussed the results of the exercise ECG stress test, rationale for decreasing flecainide back to his prior dosage, questions/concerns addressed.    Jose Browne MD, [facility]  Cardiology  Pager:  [phone]  Text Page   March 22, 2020

## 2020-04-17 ENCOUNTER — VIRTUAL VISIT (OUTPATIENT)
Dept: CARDIOLOGY | Facility: CLINIC | Age: 65
End: 2020-04-17
Payer: MEDICAID

## 2020-04-17 ENCOUNTER — TELEPHONE (OUTPATIENT)
Dept: CARDIOLOGY | Facility: CLINIC | Age: 65
End: 2020-04-17

## 2020-04-17 DIAGNOSIS — I48.0 PAROXYSMAL ATRIAL FIBRILLATION (H): Primary | ICD-10-CM

## 2020-04-17 PROCEDURE — 99204 OFFICE O/P NEW MOD 45 MIN: CPT | Mod: 95 | Performed by: INTERNAL MEDICINE

## 2020-04-17 NOTE — TELEPHONE ENCOUNTER
04/17/20 Verified with Dr Altamirano that it is ok for pt to come in for EKG and have tele health instead of in person visit in 1 week. Spoke w daughter Tyra and scheduled EKG for 4/23 at 10:30 and tele visit w Hazel Coleman PA-C on 4/23 at 1:50. Daughter has no further questions at this time. KHerroRN 200 pm

## 2020-04-17 NOTE — PROGRESS NOTES
Electrophysiology/ Virtual Clinic Note         H&P and Plan:   Patient opted to have a virtual visit due to ongoing issues related to ongoing COVID-19 virus pandemic and to minimize social interaction (based on CDC guidelines).      Visit details:  Patient has given verbal consent for this visit.    Interview was done talking and seeing patient via Internet.  Mode of transmission: Amwell.  Video/Phone start time: 9:57 AM  Video/ Phone stop time: 10:30 AM  Provider location: Home/ Clinic.  Patient location: Home.       REASON FOR VISIT: Electrophysiology evaluation.      HISTORY OF PRESENT ILLNESS: 64-year-old gentleman with a  history of  Brian-Parkinson-White syndrome (3 ablations in the past; apparently successful ablation was performed in 1992), bicuspid aortic valve, mild aortic root enlargement at 4 cm, and paroxysmal atrial fibrillation, who is here for evaluation.      Patient underwent several ablations in the past for Brian-Parkinson-White.  Apparently the he had a successful ablation 1992 and did well until 2000 when he started having again episodes of palpitation.  In 2017, the episodes became more frequent and he was evaluated here in clinic by Dr. Flanagan.  A Holter monitor, found paroxysmal atrial fibrillation and he was a started on flecainide.  Most recently, episodes of A. fib became more frequent.  He was evaluated in clinic and flecainide dose was increased to 100 mg twice daily in addition to metoprolol.  Apparently, when the higher dose of flecainide, he developed first-degree AV block and the dose was decreased.  He was then referred here for evaluation.    Today, he informs he continues to have episodes of A. fib on a daily basis.  He seems to be symptomatic with episodes and episodes may last for several hours.  He denies any other symptoms such as chest pain, shortness of breath, lightheadedness, near-syncope or syncope.    A recent EKG stress test (done on 100 mg twice daily of flecainide)  was negative for ischemia, arrhythmia or QRS widening.  Echo done in 2018 showed normal LV function and a bicuspid aortic valve with no significant valve disease.      ASSESSMENT AND PLAN:   1.    Paroxysmal atrial fibrillation.  He failed therapy with flecainide one-point a small dose) and that is a concern about side effects related to a higher dose of flecainide.  Today, we discussed options including titrate the dose of flecainide to continue monitoring the response to antiarrhythmic or pursue cath ablation procedure.  I explained procedure in details.  He understands that the success rate is 70% and is about 3% risk complications associated procedure.  After discussion he like to titrate medication.  Plan:  -Increase flecainide to 75 mg twice daily.  Continue metoprolol at current dose.  -EKG in 1 week.    - Follow-up in clinic in 1-2 weeks to reevaluate symptoms.  -If he failed therapy with flecainide, will consider ablation.    2.  Embolic prevention.  CHADS-VASc score of zero.  No need for evaluation at this time.         Javier Altamirano MD    Physical Exam:  Vitals: There were no vitals taken for this visit.    Constitutional:  Pt is in no acute distress.  Normal body habitus, upright.  HEAD: Normocephalic. No evidence of injury or laceration.   SKIN: Skin normal color with no lesions or eruptions. No xanthelasma.  ENT/Mouth:  Membranes moist. No nasal discharge or bleeding gums.   Neck:  Supple, normal JVP, no evidence of thyromegaly.  Chest/Lungs: No audible wheezing or labored breathing. Normal chest wall expansion. No cough.   Cardiovascular: Normal jugular venous pressure. No evidence of pitting edema bilaterally.   Abdomen: No evidence of abdominal distention. No observed jaundice.  Eyes: PERRL, EOMI. No scleral icterus, normal conjunctivae.  Extremities:  No lower extremity edema noticed.  No cyanosis or clubbing noted.   Neurologic: Normal arm motion bilateral.  No tremors. No evidence of focal  defect.   Psychiatric: Alert and oriented x3, calm.         CURRENT MEDICATIONS:  Current Outpatient Medications   Medication Sig Dispense Refill     flecainide (TAMBOCOR) 50 MG tablet Take 1 tablet (50 mg) by mouth 2 times daily 200 tablet 5     metoprolol succinate ER (TOPROL-XL) 25 MG 24 hr tablet Take 0.5 tablets (12.5 mg) by mouth daily 90 tablet 3     clobetasol (TEMOVATE) 0.05 % cream APPLY 1 APPLICATION TO SKIN ONCE A DAY AS NEEDED (HAND DERMATITIS).  3       ALLERGIES   No Known Allergies    PAST MEDICAL HISTORY:  Past Medical History:   Diagnosis Date     Paroxysmal atrial fibrillation (H)      WPW (Brian-Parkinson-White syndrome)        PAST SURGICAL HISTORY:  Past Surgical History:   Procedure Laterality Date     EP WPW ABLATION  1992       FAMILY HISTORY:  Family History   Problem Relation Age of Onset     Cancer Mother      Angina Father        SOCIAL HISTORY:  Social History     Socioeconomic History     Marital status:      Spouse name: Not on file     Number of children: Not on file     Years of education: Not on file     Highest education level: Not on file   Occupational History     Not on file   Social Needs     Financial resource strain: Not on file     Food insecurity     Worry: Not on file     Inability: Not on file     Transportation needs     Medical: Not on file     Non-medical: Not on file   Tobacco Use     Smoking status: Never Smoker     Smokeless tobacco: Never Used   Substance and Sexual Activity     Alcohol use: Not on file     Drug use: No     Sexual activity: Not on file   Lifestyle     Physical activity     Days per week: Not on file     Minutes per session: Not on file     Stress: Not on file   Relationships     Social connections     Talks on phone: Not on file     Gets together: Not on file     Attends Mandaen service: Not on file     Active member of club or organization: Not on file     Attends meetings of clubs or organizations: Not on file     Relationship status:  Not on file     Intimate partner violence     Fear of current or ex partner: Not on file     Emotionally abused: Not on file     Physically abused: Not on file     Forced sexual activity: Not on file   Other Topics Concern     Parent/sibling w/ CABG, MI or angioplasty before 65F 55M? No   Social History Narrative     Not on file       Review of Systems:  Skin:        Eyes:       ENT:       Respiratory:       Cardiovascular:       Gastroenterology:      Genitourinary:       Musculoskeletal:       Neurologic:       Psychiatric:       Heme/Lymph/Imm:       Endocrine:           Recent Lab Results:  LIPID RESULTS:  Lab Results   Component Value Date    CHOL 207 (H) 06/13/2019    HDL 42 06/13/2019     (H) 06/13/2019    TRIG 111 06/13/2019       LIVER ENZYME RESULTS:  No results found for: AST, ALT    CBC RESULTS:  Lab Results   Component Value Date    WBC 4.5 06/13/2019    RBC 4.57 06/13/2019    HGB 14.6 06/13/2019    HCT 41.5 06/13/2019    MCV 91 06/13/2019    MCH 31.9 06/13/2019    MCHC 35.2 06/13/2019    RDW 12.6 06/13/2019     06/13/2019       BMP RESULTS:  Lab Results   Component Value Date     06/13/2019    POTASSIUM 4.0 06/13/2019    CHLORIDE 105 06/13/2019    CO2 27 06/13/2019    ANIONGAP 11 06/13/2019     (H) 06/13/2019    BUN 22 06/13/2019    CR 1.10 06/13/2019    GFRESTIMATED 68 06/13/2019    GFRESTBLACK 82 06/13/2019    LAWRENCE 9.5 06/13/2019        A1C RESULTS:  No results found for: A1C    INR RESULTS:  No results found for: INR      ECHOCARDIOGRAM  No results found for this or any previous visit (from the past 8760 hour(s)).      No orders of the defined types were placed in this encounter.    No orders of the defined types were placed in this encounter.    There are no discontinued medications.      No diagnosis found.      CC  Pete Pham MD  [facility]  [address] [place] [address]                  Robert eFlix is a 64 year old male  "who is being evaluated via a billable video visit.      The patient has been notified of following:     \"This video visit will be conducted via a call between you and your physician/provider. We have found that certain health care needs can be provided without the need for an in-person physical exam.  This service lets us provide the care you need with a video conversation.  If a prescription is necessary we can send it directly to your pharmacy.  If lab work is needed we can place an order for that and you can then stop by our lab to have the test done at a later time.    Video visits are billed at different rates depending on your insurance coverage.  Please reach out to your insurance provider with any questions.    If during the course of the call the physician/provider feels a video visit is not appropriate, you will not be charged for this service.\"    Patient has given verbal consent for Video visit? Yes    How would you like to obtain your AVS? Mail a copy    Patient would like the video invitation sent by: Send to e-mail at: [email or web address]  NO VITALS TAKEN BY PATIENT    YOLA Chavez  Video Start Time:     Additional provider notes:        Video-Visit Details    Type of service:  Video Visit    Video End Time (time video stopped):     Originating Location (pt. Location):     Distant Location (provider location):  [facility]     Mode of Communication:  Video Conference via [employer]          "

## 2020-04-17 NOTE — TELEPHONE ENCOUNTER
"04/17/20 LM for pt as msg received from Dr Altamirano \" pt needs 1 week follow up in clinic w KARO/myself with EKG\". Awaiting callback. Chester 102 pm  "

## 2020-04-22 ENCOUNTER — TELEPHONE (OUTPATIENT)
Dept: CARDIOLOGY | Facility: CLINIC | Age: 65
End: 2020-04-22

## 2020-04-22 NOTE — TELEPHONE ENCOUNTER
Wellness Screening Tool    Symptom Screening:    Do you have one of the following new symptoms:      Fever or reported chills?  no    A new cough (started within the past 14 days)? no    Shortness of breath (started within the past 14 days)? No    Nausea, vomiting or diarrhea?  No    Within the past 3 weeks, have you been exposed to someone with a known positive illness below?      COVID - 19 (known or suspected)  no    Chicken pox?  no    Measles?  no    Pertussis?  No        Patient notified of visitor restriction: No    Patient's appointment status: Patient will be seen in clinic as scheduled on 4/23/2020  Lina Vickers LPN

## 2020-04-22 NOTE — PROGRESS NOTES
"Robert Felix is a 64 year old male who is being evaluated via a billable video visit.      The patient has been notified of following:     \"This video visit will be conducted via a call between you and your physician/provider. We have found that certain health care needs can be provided without the need for an in-person physical exam.  This service lets us provide the care you need with a video conversation.  If a prescription is necessary we can send it directly to your pharmacy.  If lab work is needed we can place an order for that and you can then stop by our lab to have the test done at a later time.    Video visits are billed at different rates depending on your insurance coverage.  Please reach out to your insurance provider with any questions.    If during the course of the call the physician/provider feels a video visit is not appropriate, you will not be charged for this service.\"    Patient has given verbal consent for Video visit? Yes    How would you like to obtain your AVS? Mail a copy    Patient would like the video invitation sent by: Send to e-mail at: [email or web address]        CC:  Atrial fibrillation    VITALS:  Bp:122/79  Pulse:82  Wt:190.0lb    Aure Cervantes RMA    BRIEF HPI:  65 yo Macedonian-speaking man who saw Dr. Altamirano just last week, who reviewed his h/o:    1. Paroxysmal AFib - started on flecainide 2017, stopped in 12/2019 for unknown reason but restarted and later increased  2. WPW - s/p ablation 1992  3. Bicuspid AV with mild root enlargement (4 cm) on echo 6/2019     Dr. Altamirano had a virtual visit with him 4/17 to discuss increasing palpitations and development of 1st degree AV Block on flecainide 100 mg BID causing dose to be decreased. Given continued daily episodes of AFib despite flecainide, Dr. Altamirano discussed AFib ablation vs increasing AAD therapy.     He opted to increase flecainide to 75 mg BID. No AC was rec'd due to CHADSVASc 0.    INTERVAL HISTORY:  Daughter Tyra" was able to translate for me (Australian). She is an RN.    Despite increasing the flecainide to 75 mg BID, still having episodes of AFib every AM before his AM dose of flecainide 75 mg BID. Still has 10-15 minutes of AFib multiple times throughout the day. He does not think increasing the flecainide to 75 mg q 12 hours has helped anything.     No CP, SOB, edema, or orthopnea. No changes since he saw Dr. Altamirano    DIAGNOSTICS:  EKG today, which I overread, showed SR 69 bpm. QRS duration 96 ms on flecainide 75 mg BID.  ms  Exercise Stress Echo 3/2020 on flecainide 100 mg BID with no stress-induced arrhythmias or ischemia.   Echo 6/2019 EF 60-65%. Grade I diastolic dysfunction. Normal RV. Normal LA size. 1+ TR. RVSP 27 mmHg, c/w mild pHTN. Trace MR1+ AI.     REVIEW OF SYSTEMS:  Negative except as noted above     PHYSICAL EXAM:  GEN: NAD. Upright. Normal body habitus  ENT/Mouth: Atraumatic and normocephalic  Eyes: No scleral icterus; normal conjunctivae  Neck: No observed thyromegaly  Chest/Lungs: No audible wheezing or cough; equal chest wall expansion. Non-labored breathing  CV: No evidence of elevated JVP. No evidence of pitting edema  Abdomen: No observed jaundice. No evidence of abdominal distention  Extremities: No cyanosis or clubbing observed  Skin: No visible rashes. Normal skin color  Neuro: Normal Arm motion bilaterally. No tremors. No visible evidence of focal defects  Psychiatric: A/O. Calm demeanor    ASSESSMENT/PLAN:    1. Paroxysmal AFib    Now on flecainide 75 mg BID since 4/17/2020.    EKG this AM done on this dose as above - still in SR but having LOTS of AFib episodes despite increase in dose    CHADSVASc 0     PLAN:    He'd like to proceed with AFib ablation with Dr. Altamirano as flecainide 75 mg BID hasn't improved his sxs and he had the 1st degree AV Block on 100 mg BID.     We discussed the risks, benefits and indications of proceeding with an electrophysiology study and pulmonary vein  isolation/atrial fibrillation ablation, including but not limited to use of anesthesia (including intubation and bladder catheter), peripheral vessel injury, discomfort, bruising, bleeding, esophageal injury, diaphragmatic injury, cardiac puncture and/or tamponade requiring emergency treatment, pulmonary vein stenosis requiring intervention, and stroke/TIA. We reviewed that additional procedures may be required.  We also briefly discussed post-procedural restrictions and post-procedural discomfort.  The patient voiced understanding and is willing to proceed.  A consent form will be signed by the procedural physician.      I will reach out to Dr. Altamirano re: timing given current C19 restrictions. Will also review need for JOSELITO/AC and CT scan with Dr. Altamirano. We did review risks of JOSELITO as well in case Dr. Altamirano would like to proceed with this as well  We discussed Risks, Benefits and Indications of proceeding with transesophageal echocardiogram (JOSELITO), including but not limited to use of conscious sedation, sore throat, esophageal injury and discomfort.       Continue flecainide 75 mg BID for now.      2. Bicuspid AV/root enlargement    Echo done 6/2019 showed no aortic stenosis and 1+ AI. Root was mildly enlarged 4.0 cm     PLAN:    See Dr. Browne 9/2020 as ordered    I have reviewed the note as documented above.  This accurately captures the substance of my conversation with the patient.        Video call contact time  Video Started at 1350  Video Ended at 1430       Cassy Coleman PA-C hospitals      Video-Visit Details    Type of service:  Video Visit    Video Start Time: 1350  Video End Time:1430    Originating Location (pt. Location): Home    Distant Location (provider location):  [facility]     Mode of Communication:  Video Conference via [facility]      Cassy Coleman PA-C

## 2020-04-23 ENCOUNTER — VIRTUAL VISIT (OUTPATIENT)
Dept: CARDIOLOGY | Facility: CLINIC | Age: 65
End: 2020-04-23
Payer: MEDICAID

## 2020-04-23 ENCOUNTER — DOCUMENTATION ONLY (OUTPATIENT)
Dept: CARDIOLOGY | Facility: CLINIC | Age: 65
End: 2020-04-23

## 2020-04-23 VITALS
SYSTOLIC BLOOD PRESSURE: 122 MMHG | HEIGHT: 69 IN | DIASTOLIC BLOOD PRESSURE: 79 MMHG | BODY MASS INDEX: 28.14 KG/M2 | WEIGHT: 190 LBS | HEART RATE: 82 BPM

## 2020-04-23 DIAGNOSIS — I48.0 PAROXYSMAL ATRIAL FIBRILLATION (H): Primary | ICD-10-CM

## 2020-04-23 DIAGNOSIS — I48.0 PAROXYSMAL ATRIAL FIBRILLATION (H): ICD-10-CM

## 2020-04-23 PROCEDURE — 93000 ELECTROCARDIOGRAM COMPLETE: CPT | Performed by: INTERNAL MEDICINE

## 2020-04-23 PROCEDURE — 99214 OFFICE O/P EST MOD 30 MIN: CPT | Mod: GT | Performed by: PHYSICIAN ASSISTANT

## 2020-04-23 ASSESSMENT — MIFFLIN-ST. JEOR: SCORE: 1652.21

## 2020-04-23 NOTE — LETTER
4/23/2020      RE: Robert Felix  [address]  [place] [address]       Dear Colleague,    Thank you for the opportunity to participate in the care of your patient, Robert Felix, at the [facility]. Please see a copy of my visit note below.    BRIEF HPI:  65 yo [place]ian-speaking man who saw Dr. Altamirano just last week, who reviewed his h/o:    1. Paroxysmal AFib - started on flecainide 2017, stopped in 12/2019 for unknown reason but restarted and later increased  2. WPW - s/p ablation 1992  3. Bicuspid AV with mild root enlargement (4 cm) on echo 6/2019     Dr. Altamirano had a virtual visit with him 4/17 to discuss increasing palpitations and development of 1st degree AV Block on flecainide 100 mg BID causing dose to be decreased. Given continued daily episodes of AFib despite flecainide, Dr. Altamirano discussed AFib ablation vs increasing AAD therapy.     He opted to increase flecainide to 75 mg BID. No AC was rec'd due to CHADSVASc 0.    INTERVAL HISTORY:  Daughter Tyra was able to translate for me ([place]ian). She is an RN.    Despite increasing the flecainide to 75 mg BID, still having episodes of AFib every AM before his AM dose of flecainide 75 mg BID. Still has 10-15 minutes of AFib multiple times throughout the day. He does not think increasing the flecainide to 75 mg q 12 hours has helped anything.     No CP, SOB, edema, or orthopnea. No changes since he saw Dr. Altamirano    DIAGNOSTICS:  EKG today, which I overread, showed SR 69 bpm. QRS duration 96 ms on flecainide 75 mg BID.  ms  Exercise Stress Echo 3/2020 on flecainide 100 mg BID with no stress-induced arrhythmias or ischemia.   Echo 6/2019 EF 60-65%. Grade I diastolic dysfunction. Normal RV. Normal LA size. 1+ TR. RVSP 27 mmHg, c/w mild pHTN. Trace MR1+ AI.     REVIEW OF SYSTEMS:  Negative except as noted above     PHYSICAL EXAM:  GEN: NAD. Upright.  Normal body habitus  ENT/Mouth: Atraumatic and normocephalic  Eyes: No scleral icterus; normal conjunctivae  Neck: No observed thyromegaly  Chest/Lungs: No audible wheezing or cough; equal chest wall expansion. Non-labored breathing  CV: No evidence of elevated JVP. No evidence of pitting edema  Abdomen: No observed jaundice. No evidence of abdominal distention  Extremities: No cyanosis or clubbing observed  Skin: No visible rashes. Normal skin color  Neuro: Normal Arm motion bilaterally. No tremors. No visible evidence of focal defects  Psychiatric: A/O. Calm demeanor    ASSESSMENT/PLAN:    1. Paroxysmal AFib    Now on flecainide 75 mg BID since 4/17/2020.    EKG this AM done on this dose as above - still in SR but having LOTS of AFib episodes despite increase in dose    CHADSVASc 0     PLAN:    He'd like to proceed with AFib ablation with Dr. Altamirano as flecainide 75 mg BID hasn't improved his sxs and he had the 1st degree AV Block on 100 mg BID.     We discussed the risks, benefits and indications of proceeding with an electrophysiology study and pulmonary vein isolation/atrial fibrillation ablation, including but not limited to use of anesthesia (including intubation and bladder catheter), peripheral vessel injury, discomfort, bruising, bleeding, esophageal injury, diaphragmatic injury, cardiac puncture and/or tamponade requiring emergency treatment, pulmonary vein stenosis requiring intervention, and stroke/TIA. We reviewed that additional procedures may be required.  We also briefly discussed post-procedural restrictions and post-procedural discomfort.  The patient voiced understanding and is willing to proceed.  A consent form will be signed by the procedural physician.      I will reach out to Dr. Altamirano re: timing given current C19 restrictions. Will also review need for JOSELITO/AC and CT scan with Dr. Altamirano. We did review risks of JOSELITO as well in case Dr. Altamirano would like to proceed with this as well  We discussed  Risks, Benefits and Indications of proceeding with transesophageal echocardiogram (JOSELITO), including but not limited to use of conscious sedation, sore throat, esophageal injury and discomfort.       Continue flecainide 75 mg BID for now.      2. Bicuspid AV/root enlargement    Echo done 6/2019 showed no aortic stenosis and 1+ AI. Root was mildly enlarged 4.0 cm     PLAN:    See Dr. Browne 9/2020 as ordered    I have reviewed the note as documented above.  This accurately captures the substance of my conversation with the patient.    Please do not hesitate to contact me if you have any questions/concerns.     Sincerely,     Cassy Coleman PA-C

## 2020-04-23 NOTE — PROGRESS NOTES
Dr. Altamirano - I had phone visit with Robert and his daughter Tyra (translates for him and is a nurse). You last saw him just last week and increased flecainide. Also discussed PVI with him    He's had NO improvement in sxs on flecainide 75 mg BID.  Still with daily episodes of AFib which are very limiting to him.  He'd like to proceed with ablation. I have many questions for you:    1. OK to proceed with ablation now or would you recommend he wait until C19 precautions have been lifted?    And if you're OK with ablation sooner rather than later given his significant sxs:    2. Daughter Tyra typically translates for him - could she come to hospital despite visitor restriction and be his ? He would really prefer her over  from service    3. Start any AC beforehand? CHADSVASc 0  4. Hold flecainide 75 mg beforehand?    5. JOSELITO? Day of ablation OK or earlier?  6. CT scan? I'm assuming yes as I don't see other previous images.    THANK YOU!    Hazle

## 2020-04-23 NOTE — PATIENT INSTRUCTIONS
Robert - it was nice to speak with you and Tyra today!     As discussed - EKG today showed normal rhythm. Your intervals on the flecainide 75 mg twice a day were acceptable BUT this dose of medication is not working for you at all, and you're still having lots of AFib    Discussed proceeding with AFib ablation under general anesthesia.  I will review need for  (ideally Tyra), any additional testing and the urgency he sees for this with Dr. Altamirano and get back to you.    Our AFib RNs are Tricia Bush and Lynnette: [phone]

## 2020-04-24 PROBLEM — I48.0 PAROXYSMAL ATRIAL FIBRILLATION (H): Status: ACTIVE | Noted: 2017-05-01

## 2020-04-24 NOTE — PROGRESS NOTES
"Pls review Dr. Altamirano's answers below (marked with a *) and call pt (daughter Tyra is a nurse and translates for him - they were both on video call yesterday and knew I'd be waiting to hear from Dr. Altamirano)    I have ordered JOSELITO/CT/AFib ablation.  I did H/P for these at our video OV yesterday and Dr. Altamirano can re-review in [facility].    Once you've talked to them, pls let Yanique know so she can set it all up with them! Pls also note (or have Yanique arrange/note) that pt's daughter will be translating during his time in the hospital so is not considered a \"visitor.\"      THANK YOU!  Hazel"

## 2020-04-24 NOTE — PROGRESS NOTES
04/24/20 Called pts daughter Tyra and discussed items in Hazel Kay note below. Daughter had pt with her and acted as  as pt only speaks [place]orese.  Pt agreed to move forward with JOSELITO/CT and Afib Ablation.Writer explained JOSELITO and CT and need for those tests prior. Writer will speak to scheduling to hopefully schedule JOSELITO/ Ablation same day so OAC does not need to be started prior to Ablation. Order sheet initiated and scheduling alerted. Family informed that scheduling will contact them . Pt/daughter have no further questions at this time. Chester 110 pm

## 2020-04-24 NOTE — PROGRESS NOTES
Below are the answers:     1. OK to proceed with ablation now or would you recommend he wait until C19 precautions have been lifted? And if you're OK with ablation sooner rather than later given his significant sxs:     * If he understands that there is always a risk of being in the hospital,  think it is ok to proceed now.  It sound to me that he was not feeling well.           2. Daughter Tyra typically translates for him - could she come to hospital despite visitor restriction and be his ? He would really prefer her over  from service       * She is a nurse.  I think we can open an exception for her.     3. Start any AC beforehand? CHADSVASc 0.     *  If we do the JOSELITO the same day. We can hold until after the procedure.     4. Hold flecainide 75 mg beforehand?       * No.  Lets continue all meds.       5. JOSELITO? Day of ablation OK or earlier?     * Ideally the same day, so he does not need to keep coming back to there hospital.     6. CT scan?   * YEs     Javier

## 2020-05-07 DIAGNOSIS — Z11.59 ENCOUNTER FOR SCREENING FOR OTHER VIRAL DISEASES: Primary | ICD-10-CM

## 2020-05-11 ENCOUNTER — TELEPHONE (OUTPATIENT)
Dept: CARDIOLOGY | Facility: CLINIC | Age: 65
End: 2020-05-11

## 2020-05-11 ENCOUNTER — HOSPITAL ENCOUNTER (OUTPATIENT)
Dept: CARDIOLOGY | Facility: CLINIC | Age: 65
End: 2020-05-11
Attending: PHYSICIAN ASSISTANT
Payer: COMMERCIAL

## 2020-05-11 ENCOUNTER — HOSPITAL ENCOUNTER (OUTPATIENT)
Facility: CLINIC | Age: 65
Discharge: HOME | End: 2020-05-11
Attending: INTERNAL MEDICINE | Admitting: INTERNAL MEDICINE
Payer: COMMERCIAL

## 2020-05-11 VITALS — HEART RATE: 50 BPM | DIASTOLIC BLOOD PRESSURE: 80 MMHG | SYSTOLIC BLOOD PRESSURE: 146 MMHG

## 2020-05-11 VITALS
WEIGHT: 192 LBS | HEIGHT: 69 IN | HEART RATE: 44 BPM | TEMPERATURE: 98 F | BODY MASS INDEX: 28.44 KG/M2 | DIASTOLIC BLOOD PRESSURE: 75 MMHG | RESPIRATION RATE: 12 BRPM | SYSTOLIC BLOOD PRESSURE: 104 MMHG | OXYGEN SATURATION: 98 %

## 2020-05-11 DIAGNOSIS — I48.0 PAROXYSMAL ATRIAL FIBRILLATION (H): Primary | ICD-10-CM

## 2020-05-11 DIAGNOSIS — I48.0 PAROXYSMAL ATRIAL FIBRILLATION (H): ICD-10-CM

## 2020-05-11 PROCEDURE — 75572 CT HRT W/3D IMAGE: CPT

## 2020-05-11 PROCEDURE — 25800030 ZZH RX IP 258 OP 636: Performed by: PHYSICIAN ASSISTANT

## 2020-05-11 PROCEDURE — 25000125 ZZHC RX 250: Performed by: INTERNAL MEDICINE

## 2020-05-11 PROCEDURE — 25000128 H RX IP 250 OP 636: Performed by: INTERNAL MEDICINE

## 2020-05-11 PROCEDURE — 40000857 ZZH STATISTIC TEE INCLUDES SEDATION

## 2020-05-11 PROCEDURE — 25800030 ZZH RX IP 258 OP 636: Performed by: INTERNAL MEDICINE

## 2020-05-11 PROCEDURE — 40000235 ZZH STATISTIC TELEMETRY

## 2020-05-11 PROCEDURE — 25000128 H RX IP 250 OP 636: Performed by: PHYSICIAN ASSISTANT

## 2020-05-11 PROCEDURE — 75572 CT HRT W/3D IMAGE: CPT | Mod: 26 | Performed by: INTERNAL MEDICINE

## 2020-05-11 RX ORDER — LIDOCAINE 50 MG/G
OINTMENT TOPICAL ONCE
Status: COMPLETED | OUTPATIENT
Start: 2020-05-11 | End: 2020-05-11

## 2020-05-11 RX ORDER — NALOXONE HYDROCHLORIDE 0.4 MG/ML
.1-.4 INJECTION, SOLUTION INTRAMUSCULAR; INTRAVENOUS; SUBCUTANEOUS
Status: DISCONTINUED | OUTPATIENT
Start: 2020-05-11 | End: 2020-05-11 | Stop reason: HOSPADM

## 2020-05-11 RX ORDER — DEXTROSE MONOHYDRATE 25 G/50ML
9.5 INJECTION, SOLUTION INTRAVENOUS
Status: DISCONTINUED | OUTPATIENT
Start: 2020-05-11 | End: 2020-05-11 | Stop reason: HOSPADM

## 2020-05-11 RX ORDER — LIDOCAINE HYDROCHLORIDE 40 MG/ML
1.5 SOLUTION TOPICAL ONCE
Status: COMPLETED | OUTPATIENT
Start: 2020-05-11 | End: 2020-05-11

## 2020-05-11 RX ORDER — FLUMAZENIL 0.1 MG/ML
0.2 INJECTION, SOLUTION INTRAVENOUS
Status: DISCONTINUED | OUTPATIENT
Start: 2020-05-11 | End: 2020-05-11 | Stop reason: HOSPADM

## 2020-05-11 RX ORDER — IOPAMIDOL 755 MG/ML
500 INJECTION, SOLUTION INTRAVASCULAR ONCE
Status: COMPLETED | OUTPATIENT
Start: 2020-05-11 | End: 2020-05-11

## 2020-05-11 RX ORDER — SODIUM CHLORIDE 9 MG/ML
INJECTION, SOLUTION INTRAVENOUS CONTINUOUS PRN
Status: DISCONTINUED | OUTPATIENT
Start: 2020-05-11 | End: 2020-05-11 | Stop reason: HOSPADM

## 2020-05-11 RX ORDER — FENTANYL CITRATE 50 UG/ML
25 INJECTION, SOLUTION INTRAMUSCULAR; INTRAVENOUS
Status: DISCONTINUED | OUTPATIENT
Start: 2020-05-11 | End: 2020-05-11 | Stop reason: HOSPADM

## 2020-05-11 RX ORDER — FENTANYL CITRATE 50 UG/ML
50 INJECTION, SOLUTION INTRAMUSCULAR; INTRAVENOUS ONCE
Status: COMPLETED | OUTPATIENT
Start: 2020-05-11 | End: 2020-05-11

## 2020-05-11 RX ORDER — GLYCOPYRROLATE 0.2 MG/ML
0.1 INJECTION, SOLUTION INTRAMUSCULAR; INTRAVENOUS ONCE
Status: COMPLETED | OUTPATIENT
Start: 2020-05-11 | End: 2020-05-11

## 2020-05-11 RX ADMIN — LIDOCAINE HYDROCHLORIDE 1.5 ML: 40 SOLUTION TOPICAL at 13:21

## 2020-05-11 RX ADMIN — SODIUM CHLORIDE: 9 INJECTION, SOLUTION INTRAVENOUS at 12:39

## 2020-05-11 RX ADMIN — FENTANYL CITRATE 25 MCG: 50 INJECTION, SOLUTION INTRAMUSCULAR; INTRAVENOUS at 13:42

## 2020-05-11 RX ADMIN — GLYCOPYRROLATE 0.1 MG: 0.2 INJECTION, SOLUTION INTRAMUSCULAR; INTRAVENOUS at 13:21

## 2020-05-11 RX ADMIN — FENTANYL CITRATE 25 MCG: 50 INJECTION, SOLUTION INTRAMUSCULAR; INTRAVENOUS at 13:58

## 2020-05-11 RX ADMIN — IOPAMIDOL 100 ML: 755 INJECTION, SOLUTION INTRAVENOUS at 10:19

## 2020-05-11 RX ADMIN — MIDAZOLAM HYDROCHLORIDE 1 MG: 1 INJECTION, SOLUTION INTRAMUSCULAR; INTRAVENOUS at 13:54

## 2020-05-11 RX ADMIN — TOPICAL ANESTHETIC 0.5 ML: 200 SPRAY DENTAL; PERIODONTAL at 13:38

## 2020-05-11 RX ADMIN — MIDAZOLAM HYDROCHLORIDE 1 MG: 1 INJECTION, SOLUTION INTRAMUSCULAR; INTRAVENOUS at 13:43

## 2020-05-11 RX ADMIN — FENTANYL CITRATE 25 MCG: 50 INJECTION, SOLUTION INTRAMUSCULAR; INTRAVENOUS at 13:44

## 2020-05-11 RX ADMIN — SODIUM CHLORIDE 100 ML: 9 INJECTION, SOLUTION INTRAVENOUS at 10:19

## 2020-05-11 RX ADMIN — FENTANYL CITRATE 25 MCG: 50 INJECTION, SOLUTION INTRAMUSCULAR; INTRAVENOUS at 13:54

## 2020-05-11 RX ADMIN — MIDAZOLAM HYDROCHLORIDE 1 MG: 1 INJECTION, SOLUTION INTRAMUSCULAR; INTRAVENOUS at 13:56

## 2020-05-11 RX ADMIN — MIDAZOLAM HYDROCHLORIDE 1 MG: 1 INJECTION, SOLUTION INTRAMUSCULAR; INTRAVENOUS at 13:41

## 2020-05-11 ASSESSMENT — MIFFLIN-ST. JEOR: SCORE: 1661.29

## 2020-05-11 NOTE — PROGRESS NOTES
Care Suites Admission Nursing Note    Patient Information  Name: Robert Felix  Age: 64 year old  Reason for admission: JOSELITO  Care Suites arrival time: 1155    Patient Admission/Assessment   Pre-procedure assessment complete: Yes  If abnormal assessment/labs, provider notified: N/A  NPO: Yes  Medications held per instructions/orders: N/A  Consent: await cardiologist  If applicable, pregnancy test status: NA  Patient oriented to room: Yes  Education/questions answered: Yes  Plan/other: Procedure explained and questions answered via  (lilian Mary RN)    Discharge Planning  Accompanied by: Daughter  Discharge name/phone number: Tyra [phone]   Overnight post sedation caregiver: wife and daughter  Discharge location: home    Stefany Warner RN

## 2020-05-11 NOTE — DISCHARGE INSTRUCTIONS
Sedation Discharge Instructions     After you go home:      You may resume your normal diet    Have an adult stay with you for 6 hours if you received sedation       For 24 hours - due to the sedation you received:    Relax and take it easy    Do NOT make any important or legal decisions    Do NOT drive or operate machines at home or at work    Do NOT drink alcohol    Activity       You may go back to normal activity in 24 hours    Medicines:      You may resume all medications    Follow- up for A-fib ablation on 5/13/20 per Dr Altamirano.                   If you have questions call:          [facility] @ [phone]

## 2020-05-11 NOTE — PROGRESS NOTES
Care Suites Discharge Nursing Note    Patient Information  Name: Robert Felix  Age: 64 year old    Discharge Education:  Discharge instructions reviewed: Yes  Additional education/resources provided: Because procedure was unable to be completed, further instructions to talk with heart clinic regarding Ablation scheduled for 5/13/20/  Patient/patient representative verbalizes understanding: Yes Instructions for post sedation given to daughter, who also interpreted for Armani  Patient discharging on new medications: No  Medication education completed: N/A    Discharge Plans:   Discharge location: home  Discharge ride contacted: Yes  Approximate discharge time: 1505    Discharge Criteria:  Discharge criteria met and vital signs stable: Yes    Patient Belongs:  Patient belongings returned to patient: Yes    Stefany Warner RN

## 2020-05-12 ENCOUNTER — ANESTHESIA EVENT (OUTPATIENT)
Dept: CARDIOLOGY | Facility: CLINIC | Age: 65
End: 2020-05-12
Payer: COMMERCIAL

## 2020-05-12 ENCOUNTER — TELEPHONE (OUTPATIENT)
Dept: CARDIOLOGY | Facility: CLINIC | Age: 65
End: 2020-05-12

## 2020-05-12 DIAGNOSIS — I48.0 PAROXYSMAL ATRIAL FIBRILLATION (H): Primary | ICD-10-CM

## 2020-05-12 RX ORDER — LIDOCAINE 40 MG/G
CREAM TOPICAL
Status: CANCELLED | OUTPATIENT
Start: 2020-05-12

## 2020-05-12 RX ORDER — SODIUM CHLORIDE, SODIUM LACTATE, POTASSIUM CHLORIDE, CALCIUM CHLORIDE 600; 310; 30; 20 MG/100ML; MG/100ML; MG/100ML; MG/100ML
INJECTION, SOLUTION INTRAVENOUS CONTINUOUS
Status: CANCELLED | OUTPATIENT
Start: 2020-05-12

## 2020-05-12 NOTE — PROGRESS NOTES
Called the patient's daughter (Tyra [phone]) to apologize for the communication error regarding the ability for her to come with her father during procedures to translate. Patient's daughter state that she understood. Patient's daughter would like to use [employer] to communicate with the nurse tomorrow. The patient has an iphone and her daughter has on iphone. Informed the patient's daughter that we could use [employer] if she prefers and both phones have the capability. Informed the daughter that we do not have ability to facetime on our  services ipad.   Provided the daughter with [facility]es nursing station phone number as well as writer's phone in case she has any more questions or concerns.

## 2020-05-12 NOTE — TELEPHONE ENCOUNTER
Spoke to pt who is aware that he is to arrive at 0730 for JOSELITO and Ablation.  Pt aware that he is to have nothing to eat after midnight and sips of water with his AM medications.  Discussed with pt that it will be determined post procedure if he will be going home or staying ovenight. Explained that either way he will have discharge instructions told to him and f/u appointments made. Pt has asked if since his daughter can not come into the hospital if they can do a face time prior to procedure.  Told pt that this writer can ask, but this is not a for sure deal.  LM with Betty Dooley. Pt has no further questions or concerns at this time. COVID Screen completed as pt was not tested. [facility]     Wellness Screening Tool    Symptom Screening:    Do you have one of the following new symptoms:      Fever or reported chills?  No    A new cough (started within the past 14 days)? or No    Shortness of breath (started within the past 14 days)? No    Nausea, vomiting or diarrhea?  No    Within the past 3 weeks, have you been exposed to someone with a known positive illness below?      COVID - 19 (known or suspected)  no    Chicken pox?   no    Measles?  no    Pertussis? No          Patient notified of visitor restriction: Yes  Patient informed to wear a mask: YES     Patient's appointment status: Patient will be seen in hospital as scheduled on 5/13 for A Fib Ablation

## 2020-05-13 ENCOUNTER — HOSPITAL ENCOUNTER (OUTPATIENT)
Dept: CARDIOLOGY | Facility: CLINIC | Age: 65
End: 2020-05-13
Attending: INTERNAL MEDICINE
Payer: COMMERCIAL

## 2020-05-13 ENCOUNTER — ANESTHESIA (OUTPATIENT)
Dept: CARDIOLOGY | Facility: CLINIC | Age: 65
End: 2020-05-13
Payer: COMMERCIAL

## 2020-05-13 ENCOUNTER — HOSPITAL ENCOUNTER (OUTPATIENT)
Facility: CLINIC | Age: 65
Discharge: HOME | End: 2020-05-13
Admitting: INTERNAL MEDICINE
Payer: COMMERCIAL

## 2020-05-13 VITALS
HEART RATE: 72 BPM | OXYGEN SATURATION: 96 % | HEIGHT: 69 IN | BODY MASS INDEX: 28.29 KG/M2 | DIASTOLIC BLOOD PRESSURE: 90 MMHG | TEMPERATURE: 97.3 F | RESPIRATION RATE: 16 BRPM | SYSTOLIC BLOOD PRESSURE: 148 MMHG | WEIGHT: 191 LBS

## 2020-05-13 DIAGNOSIS — I48.0 PAROXYSMAL ATRIAL FIBRILLATION (H): ICD-10-CM

## 2020-05-13 LAB
ANION GAP SERPL CALCULATED.3IONS-SCNC: 7 MMOL/L (ref 3–14)
BUN SERPL-MCNC: 19 MG/DL (ref 7–30)
CALCIUM SERPL-MCNC: 8.7 MG/DL (ref 8.5–10.1)
CHLORIDE SERPL-SCNC: 110 MMOL/L (ref 94–109)
CO2 SERPL-SCNC: 25 MMOL/L (ref 20–32)
CREAT SERPL-MCNC: 1.05 MG/DL (ref 0.66–1.25)
ERYTHROCYTE [DISTWIDTH] IN BLOOD BY AUTOMATED COUNT: 12.2 % (ref 10–15)
GFR SERPL CREATININE-BSD FRML MDRD: 75 ML/MIN/{1.73_M2}
GLUCOSE SERPL-MCNC: 120 MG/DL (ref 70–99)
HCT VFR BLD AUTO: 41.3 % (ref 40–53)
HGB BLD-MCNC: 14.3 G/DL (ref 13.3–17.7)
KCT BLD-ACNC: 110 SEC (ref 75–150)
KCT BLD-ACNC: 195 SEC (ref 75–150)
KCT BLD-ACNC: 239 SEC (ref 75–150)
KCT BLD-ACNC: 284 SEC (ref 75–150)
KCT BLD-ACNC: 336 SEC (ref 75–150)
MCH RBC QN AUTO: 31.7 PG (ref 26.5–33)
MCHC RBC AUTO-ENTMCNC: 34.6 G/DL (ref 31.5–36.5)
MCV RBC AUTO: 92 FL (ref 78–100)
PLATELET # BLD AUTO: 182 10E9/L (ref 150–450)
POTASSIUM SERPL-SCNC: 3.8 MMOL/L (ref 3.4–5.3)
RBC # BLD AUTO: 4.51 10E12/L (ref 4.4–5.9)
SODIUM SERPL-SCNC: 142 MMOL/L (ref 133–144)
WBC # BLD AUTO: 4.6 10E9/L (ref 4–11)

## 2020-05-13 PROCEDURE — 25800030 ZZH RX IP 258 OP 636: Performed by: NURSE ANESTHETIST, CERTIFIED REGISTERED

## 2020-05-13 PROCEDURE — 40000065 ZZH STATISTIC EKG NON-CHARGEABLE

## 2020-05-13 PROCEDURE — 25000125 ZZHC RX 250: Performed by: INTERNAL MEDICINE

## 2020-05-13 PROCEDURE — 93662 INTRACARDIAC ECG (ICE): CPT | Performed by: INTERNAL MEDICINE

## 2020-05-13 PROCEDURE — 93005 ELECTROCARDIOGRAM TRACING: CPT

## 2020-05-13 PROCEDURE — C1732 CATH, EP, DIAG/ABL, 3D/VECT: HCPCS | Performed by: INTERNAL MEDICINE

## 2020-05-13 PROCEDURE — 80048 BASIC METABOLIC PNL TOTAL CA: CPT

## 2020-05-13 PROCEDURE — 25000125 ZZHC RX 250: Performed by: NURSE ANESTHETIST, CERTIFIED REGISTERED

## 2020-05-13 PROCEDURE — 93325 DOPPLER ECHO COLOR FLOW MAPG: CPT | Mod: 26 | Performed by: INTERNAL MEDICINE

## 2020-05-13 PROCEDURE — 99152 MOD SED SAME PHYS/QHP 5/>YRS: CPT | Performed by: INTERNAL MEDICINE

## 2020-05-13 PROCEDURE — C1759 CATH, INTRA ECHOCARDIOGRAPHY: HCPCS | Performed by: INTERNAL MEDICINE

## 2020-05-13 PROCEDURE — 27210794 ZZH OR GENERAL SUPPLY STERILE: Performed by: INTERNAL MEDICINE

## 2020-05-13 PROCEDURE — 93655 ICAR CATH ABLTJ DSCRT ARRHYT: CPT | Performed by: INTERNAL MEDICINE

## 2020-05-13 PROCEDURE — 93010 ELECTROCARDIOGRAM REPORT: CPT | Mod: 76 | Performed by: INTERNAL MEDICINE

## 2020-05-13 PROCEDURE — C1894 INTRO/SHEATH, NON-LASER: HCPCS | Performed by: INTERNAL MEDICINE

## 2020-05-13 PROCEDURE — C1769 GUIDE WIRE: HCPCS | Performed by: INTERNAL MEDICINE

## 2020-05-13 PROCEDURE — 25000128 H RX IP 250 OP 636: Performed by: INTERNAL MEDICINE

## 2020-05-13 PROCEDURE — 25000566 ZZH SEVOFLURANE, EA 15 MIN: Performed by: INTERNAL MEDICINE

## 2020-05-13 PROCEDURE — 93325 DOPPLER ECHO COLOR FLOW MAPG: CPT

## 2020-05-13 PROCEDURE — 93320 DOPPLER ECHO COMPLETE: CPT | Mod: 26 | Performed by: INTERNAL MEDICINE

## 2020-05-13 PROCEDURE — 93613 INTRACARDIAC EPHYS 3D MAPG: CPT | Performed by: INTERNAL MEDICINE

## 2020-05-13 PROCEDURE — 93312 ECHO TRANSESOPHAGEAL: CPT | Mod: 26 | Performed by: INTERNAL MEDICINE

## 2020-05-13 PROCEDURE — 37000009 ZZH ANESTHESIA TECHNICAL FEE, EACH ADDTL 15 MIN: Performed by: INTERNAL MEDICINE

## 2020-05-13 PROCEDURE — 85347 COAGULATION TIME ACTIVATED: CPT

## 2020-05-13 PROCEDURE — 85027 COMPLETE CBC AUTOMATED: CPT

## 2020-05-13 PROCEDURE — C1730 CATH, EP, 19 OR FEW ELECT: HCPCS | Performed by: INTERNAL MEDICINE

## 2020-05-13 PROCEDURE — 25800030 ZZH RX IP 258 OP 636: Performed by: INTERNAL MEDICINE

## 2020-05-13 PROCEDURE — 93656 COMPRE EP EVAL ABLTJ ATR FIB: CPT | Performed by: INTERNAL MEDICINE

## 2020-05-13 PROCEDURE — 25000128 H RX IP 250 OP 636: Performed by: NURSE ANESTHETIST, CERTIFIED REGISTERED

## 2020-05-13 PROCEDURE — 93462 L HRT CATH TRNSPTL PUNCTURE: CPT | Performed by: INTERNAL MEDICINE

## 2020-05-13 PROCEDURE — 37000008 ZZH ANESTHESIA TECHNICAL FEE, 1ST 30 MIN: Performed by: INTERNAL MEDICINE

## 2020-05-13 PROCEDURE — 93653 COMPRE EP EVAL TX SVT: CPT | Performed by: INTERNAL MEDICINE

## 2020-05-13 PROCEDURE — 40000852 ZZH STATISTIC HEART CATH LAB OR EP LAB

## 2020-05-13 PROCEDURE — C1733 CATH, EP, OTHR THAN COOL-TIP: HCPCS | Performed by: INTERNAL MEDICINE

## 2020-05-13 RX ORDER — NALOXONE HYDROCHLORIDE 0.4 MG/ML
.1-.4 INJECTION, SOLUTION INTRAMUSCULAR; INTRAVENOUS; SUBCUTANEOUS
Status: DISCONTINUED | OUTPATIENT
Start: 2020-05-13 | End: 2020-05-13 | Stop reason: HOSPADM

## 2020-05-13 RX ORDER — FENTANYL CITRATE 50 UG/ML
INJECTION, SOLUTION INTRAMUSCULAR; INTRAVENOUS PRN
Status: DISCONTINUED | OUTPATIENT
Start: 2020-05-13 | End: 2020-05-13

## 2020-05-13 RX ORDER — HEPARIN SODIUM 200 [USP'U]/100ML
100-600 INJECTION, SOLUTION INTRAVENOUS CONTINUOUS PRN
Status: DISCONTINUED | OUTPATIENT
Start: 2020-05-13 | End: 2020-05-13 | Stop reason: HOSPADM

## 2020-05-13 RX ORDER — SODIUM CHLORIDE, SODIUM LACTATE, POTASSIUM CHLORIDE, CALCIUM CHLORIDE 600; 310; 30; 20 MG/100ML; MG/100ML; MG/100ML; MG/100ML
INJECTION, SOLUTION INTRAVENOUS CONTINUOUS
Status: DISCONTINUED | OUTPATIENT
Start: 2020-05-13 | End: 2020-05-13 | Stop reason: HOSPADM

## 2020-05-13 RX ORDER — FENTANYL CITRATE 50 UG/ML
25-50 INJECTION, SOLUTION INTRAMUSCULAR; INTRAVENOUS
Status: DISCONTINUED | OUTPATIENT
Start: 2020-05-13 | End: 2020-05-13 | Stop reason: HOSPADM

## 2020-05-13 RX ORDER — NEOSTIGMINE METHYLSULFATE 1 MG/ML
VIAL (ML) INJECTION PRN
Status: DISCONTINUED | OUTPATIENT
Start: 2020-05-13 | End: 2020-05-13

## 2020-05-13 RX ORDER — GLYCOPYRROLATE 0.2 MG/ML
INJECTION, SOLUTION INTRAMUSCULAR; INTRAVENOUS PRN
Status: DISCONTINUED | OUTPATIENT
Start: 2020-05-13 | End: 2020-05-13

## 2020-05-13 RX ORDER — LIDOCAINE HYDROCHLORIDE 20 MG/ML
INJECTION, SOLUTION INFILTRATION; PERINEURAL PRN
Status: DISCONTINUED | OUTPATIENT
Start: 2020-05-13 | End: 2020-05-13

## 2020-05-13 RX ORDER — EPHEDRINE SULFATE 50 MG/ML
INJECTION, SOLUTION INTRAMUSCULAR; INTRAVENOUS; SUBCUTANEOUS PRN
Status: DISCONTINUED | OUTPATIENT
Start: 2020-05-13 | End: 2020-05-13

## 2020-05-13 RX ORDER — ONDANSETRON 2 MG/ML
4 INJECTION INTRAMUSCULAR; INTRAVENOUS EVERY 30 MIN PRN
Status: DISCONTINUED | OUTPATIENT
Start: 2020-05-13 | End: 2020-05-13 | Stop reason: HOSPADM

## 2020-05-13 RX ORDER — PROTAMINE SULFATE 10 MG/ML
INJECTION, SOLUTION INTRAVENOUS
Status: DISCONTINUED | OUTPATIENT
Start: 2020-05-13 | End: 2020-05-13 | Stop reason: HOSPADM

## 2020-05-13 RX ORDER — LABETALOL HYDROCHLORIDE 5 MG/ML
10 INJECTION, SOLUTION INTRAVENOUS
Status: DISCONTINUED | OUTPATIENT
Start: 2020-05-13 | End: 2020-05-13 | Stop reason: HOSPADM

## 2020-05-13 RX ORDER — KETOROLAC TROMETHAMINE 30 MG/ML
INJECTION, SOLUTION INTRAMUSCULAR; INTRAVENOUS PRN
Status: DISCONTINUED | OUTPATIENT
Start: 2020-05-13 | End: 2020-05-13

## 2020-05-13 RX ORDER — HEPARIN SODIUM 200 [USP'U]/100ML
100-500 INJECTION, SOLUTION INTRAVENOUS CONTINUOUS PRN
Status: DISCONTINUED | OUTPATIENT
Start: 2020-05-13 | End: 2020-05-13 | Stop reason: HOSPADM

## 2020-05-13 RX ORDER — DOBUTAMINE HYDROCHLORIDE 200 MG/100ML
5-40 INJECTION INTRAVENOUS CONTINUOUS PRN
Status: DISCONTINUED | OUTPATIENT
Start: 2020-05-13 | End: 2020-05-13 | Stop reason: HOSPADM

## 2020-05-13 RX ORDER — HYDROMORPHONE HYDROCHLORIDE 1 MG/ML
.3-.5 INJECTION, SOLUTION INTRAMUSCULAR; INTRAVENOUS; SUBCUTANEOUS EVERY 5 MIN PRN
Status: DISCONTINUED | OUTPATIENT
Start: 2020-05-13 | End: 2020-05-13 | Stop reason: HOSPADM

## 2020-05-13 RX ORDER — PROPOFOL 10 MG/ML
INJECTION, EMULSION INTRAVENOUS PRN
Status: DISCONTINUED | OUTPATIENT
Start: 2020-05-13 | End: 2020-05-13

## 2020-05-13 RX ORDER — HYDRALAZINE HYDROCHLORIDE 20 MG/ML
2.5-5 INJECTION INTRAMUSCULAR; INTRAVENOUS EVERY 10 MIN PRN
Status: DISCONTINUED | OUTPATIENT
Start: 2020-05-13 | End: 2020-05-13 | Stop reason: HOSPADM

## 2020-05-13 RX ORDER — CEFAZOLIN SODIUM 1 G/3ML
1 INJECTION, POWDER, FOR SOLUTION INTRAMUSCULAR; INTRAVENOUS
Status: DISCONTINUED | OUTPATIENT
Start: 2020-05-13 | End: 2020-05-13 | Stop reason: HOSPADM

## 2020-05-13 RX ORDER — HEPARIN SODIUM 1000 [USP'U]/ML
INJECTION, SOLUTION INTRAVENOUS; SUBCUTANEOUS
Status: DISCONTINUED | OUTPATIENT
Start: 2020-05-13 | End: 2020-05-13 | Stop reason: HOSPADM

## 2020-05-13 RX ORDER — ONDANSETRON 4 MG/1
4 TABLET, ORALLY DISINTEGRATING ORAL EVERY 30 MIN PRN
Status: DISCONTINUED | OUTPATIENT
Start: 2020-05-13 | End: 2020-05-13 | Stop reason: HOSPADM

## 2020-05-13 RX ORDER — OXYCODONE AND ACETAMINOPHEN 5; 325 MG/1; MG/1
1 TABLET ORAL EVERY 4 HOURS PRN
Status: DISCONTINUED | OUTPATIENT
Start: 2020-05-13 | End: 2020-05-13 | Stop reason: HOSPADM

## 2020-05-13 RX ORDER — FUROSEMIDE 10 MG/ML
INJECTION INTRAMUSCULAR; INTRAVENOUS PRN
Status: DISCONTINUED | OUTPATIENT
Start: 2020-05-13 | End: 2020-05-13

## 2020-05-13 RX ORDER — LIDOCAINE 40 MG/G
CREAM TOPICAL
Status: DISCONTINUED | OUTPATIENT
Start: 2020-05-13 | End: 2020-05-13 | Stop reason: HOSPADM

## 2020-05-13 RX ORDER — ONDANSETRON 2 MG/ML
INJECTION INTRAMUSCULAR; INTRAVENOUS PRN
Status: DISCONTINUED | OUTPATIENT
Start: 2020-05-13 | End: 2020-05-13

## 2020-05-13 RX ADMIN — ROCURONIUM BROMIDE 50 MG: 10 INJECTION INTRAVENOUS at 09:20

## 2020-05-13 RX ADMIN — CEFAZOLIN 1 G: 1 INJECTION, POWDER, FOR SOLUTION INTRAMUSCULAR; INTRAVENOUS at 09:25

## 2020-05-13 RX ADMIN — SODIUM CHLORIDE, POTASSIUM CHLORIDE, SODIUM LACTATE AND CALCIUM CHLORIDE: 600; 310; 30; 20 INJECTION, SOLUTION INTRAVENOUS at 09:10

## 2020-05-13 RX ADMIN — SODIUM CHLORIDE, POTASSIUM CHLORIDE, SODIUM LACTATE AND CALCIUM CHLORIDE: 600; 310; 30; 20 INJECTION, SOLUTION INTRAVENOUS at 08:39

## 2020-05-13 RX ADMIN — Medication 10 MG: at 09:46

## 2020-05-13 RX ADMIN — CEFAZOLIN 1 G: 1 INJECTION, POWDER, FOR SOLUTION INTRAMUSCULAR; INTRAVENOUS at 11:25

## 2020-05-13 RX ADMIN — FENTANYL CITRATE 100 MCG: 50 INJECTION, SOLUTION INTRAMUSCULAR; INTRAVENOUS at 09:19

## 2020-05-13 RX ADMIN — MIDAZOLAM 2 MG: 1 INJECTION INTRAMUSCULAR; INTRAVENOUS at 09:10

## 2020-05-13 RX ADMIN — PROPOFOL 150 MG: 10 INJECTION, EMULSION INTRAVENOUS at 09:19

## 2020-05-13 RX ADMIN — ONDANSETRON 4 MG: 2 INJECTION INTRAMUSCULAR; INTRAVENOUS at 11:55

## 2020-05-13 RX ADMIN — GLYCOPYRROLATE 0.8 MG: 0.2 INJECTION, SOLUTION INTRAMUSCULAR; INTRAVENOUS at 11:58

## 2020-05-13 RX ADMIN — Medication 5 MG: at 11:58

## 2020-05-13 RX ADMIN — KETOROLAC TROMETHAMINE 15 MG: 30 INJECTION, SOLUTION INTRAMUSCULAR at 12:04

## 2020-05-13 RX ADMIN — LIDOCAINE HYDROCHLORIDE 100 MG: 20 INJECTION, SOLUTION INFILTRATION; PERINEURAL at 09:19

## 2020-05-13 RX ADMIN — PHENYLEPHRINE HYDROCHLORIDE 0.25 MCG/KG/MIN: 10 INJECTION INTRAVENOUS at 09:29

## 2020-05-13 RX ADMIN — Medication 10 MG: at 09:32

## 2020-05-13 RX ADMIN — FUROSEMIDE 40 MG: 10 INJECTION, SOLUTION INTRAVENOUS at 12:04

## 2020-05-13 ASSESSMENT — MIFFLIN-ST. JEOR: SCORE: 1656.75

## 2020-05-13 ASSESSMENT — ENCOUNTER SYMPTOMS: DYSRHYTHMIAS: 1

## 2020-05-13 ASSESSMENT — LIFESTYLE VARIABLES: TOBACCO_USE: 0

## 2020-05-13 NOTE — PROGRESS NOTES
64-year-old gentleman with a  history of  Brian-Parkinson-White syndrome (3 ablations in the past; apparently successful ablation was performed in 1992), bicuspid aortic valve, mild aortic root enlargement and paroxysmal atrial fibrillation/ flutter, who failed rhythm control strategy with flecainide and metoprolol.  He presents with daily episodes of Afib resulting in significant deterioration in quality of life and was referred for electrophysiology study/ ablation.    Of note, case was initially delayed due to ongoing COVID-19 pandemic issues. However, due to ongoing frequent episodes of Afib and severe symptoms, case was deemed to be TIER- 2 level of priority based on current government guidelines, and he was referred for ablation.    Criteria for Procedure  o Tier 1 - time sensitive in less than one week - schedule now  o Tier 2 - time sensitive w/in 1 month - schedule now  o Tier 3 - time sensitive w/in 2 months  o Tier 4 - Elective        He is CHADS-vasc score of zero and is currently in normal sinus rhythm.  He will undergo JOSELITO to rule out presence of PETER clot prior to ablation. He is aware he will need oral anticoagulation for at least 3 months after procedure and agrees with plan.    Chest CT revealed normal pulmonary venous anatomy with all pulmonary veins draining into the left atrium. There was a 7-mm pulmonary nodule in the in the anterior right lung base, which it will need a follow up CT in 6 months.    Consent was obtained with help of daughter for translation. Options were discussed including do nothing, pharmacotherapy or catheter ablation procedure.  Procedure was explained in details.  Patient understands the pro/cons of each option.  He understands there is 3-4% risk of complication associated with procedure.  He would like to pursue ablation.  Consent was signed.      Javier Altamirano MD

## 2020-05-13 NOTE — PROGRESS NOTES
Pt returns to [facility] via cart from PACU at 1345. Bilateral groin sites stable without bleeding or hematoma. At 1400 pt states urge to urinate --attempted to void using urinal--pt unable and moaning with pain --unable to urinate.  Leahy catheter placed with immediate urine return.

## 2020-05-13 NOTE — ANESTHESIA PREPROCEDURE EVALUATION
Anesthesia Pre-Procedure Evaluation    Patient: Robert Felix   MRN: [id number] : 1955          Preoperative Diagnosis: sx'c paroxysmal AFib    Procedure(s):  EP Ablation Focal AFIB    Past Medical History:   Diagnosis Date     Paroxysmal atrial fibrillation (H)      WPW (Brian-Parkinson-White syndrome)      Past Surgical History:   Procedure Laterality Date     EP WPW ABLATION         Anesthesia Evaluation     . Pt has had prior anesthetic.     No history of anesthetic complications          ROS/MED HX    ENT/Pulmonary:      (-) tobacco use   Neurologic:       Cardiovascular:     (+) Dyslipidemia, ----. : . . . :. dysrhythmias (s/p WPW ablation) a-fib and WPW, Irregular Heartbeat/Palpitations, valvular problems/murmurs type: AI Bicuspid AV c/b mild AI:. pulmonary hypertension (mild), Previous cardiac testing Echodate:19results:Left Ventricle:  The left ventricle is normal in size. There is normal left ventricular wall thickness. Left ventricular systolic function is normal. The visual ejection fraction is estimated at 60-65%. Grade I or early diastolic dysfunction. Normal left ventricular wall motion.    Right Ventricle:  The right ventricle is normal in structure, function and size.    Atria:   Normal left atrial size. Right atrial size is normal. There is no atrial shunt seen.    Mitral Valve:  The mitral valve is normal in structure and function. There is trace mitral regurgitation.    Tricuspid Valve:  There is mild (1+) tricuspid regurgitation. The right ventricular systolic pressure is approximated at 26.8 mmHg plus the right atrial pressure. Right ventricular systolic pressure is elevated, consistent with mild pulmonary hypertension. IVC diameter and respiratory changes fall into an intermediate range suggesting an RA pressure of 8 mmHg.    Aortic Valve:  The aortic valve is bicuspid. There is mild (1+) aortic regurgitation. No hemodynamically significant valvular aortic  "stenosis.    Pulmonic Valve:  The pulmonic valve is not well seen, but is grossly normal. There is trace pulmonic valvular regurgitation.    Vessels:  Mild aortic root dilatation.    Pericardium:  There is no pericardial effusion.    Rhythm:  Sinus rhythm was noted.    Stress Testdate:3/18/20 results:Negative for ischemiaECG reviewed date:4/24/20 results:NSR date: results:          METS/Exercise Tolerance:  >4 METS   Hematologic:         Musculoskeletal:         GI/Hepatic:         Renal/Genitourinary:         Endo:         Psychiatric:         Infectious Disease:         Malignancy:         Other:                                 Lab Results   Component Value Date    WBC 4.5 06/13/2019    HGB 14.6 06/13/2019    HCT 41.5 06/13/2019     06/13/2019     06/13/2019    POTASSIUM 4.0 06/13/2019    CHLORIDE 105 06/13/2019    CO2 27 06/13/2019    BUN 22 06/13/2019    CR 1.10 06/13/2019     (H) 06/13/2019    LAWRENCE 9.5 06/13/2019    TSH 1.67 06/13/2019       Preop Vitals  BP Readings from Last 3 Encounters:   05/11/20 104/75   05/11/20 (!) 146/80   04/23/20 122/79    Pulse Readings from Last 3 Encounters:   05/11/20 (!) 44   05/11/20 50   04/23/20 82      Resp Readings from Last 3 Encounters:   05/11/20 12    SpO2 Readings from Last 3 Encounters:   05/11/20 98%   03/12/20 97%   06/13/19 99%      Temp Readings from Last 1 Encounters:   05/11/20 36.7  C (98  F) (Oral)    Ht Readings from Last 1 Encounters:   05/11/20 1.753 m (5' 9\")      Wt Readings from Last 1 Encounters:   05/11/20 87.1 kg (192 lb)    Estimated body mass index is 28.35 kg/m  as calculated from the following:    Height as of 5/11/20: 1.753 m (5' 9\").    Weight as of 5/11/20: 87.1 kg (192 lb).       Anesthesia Plan      History & Physical Review  History and physical reviewed and following examination; no interval change.    ASA Status:  3 .    NPO Status:  > 8 hours    Plan for General with Intravenous and Propofol induction. Maintenance will " be Balanced.    PONV prophylaxis:  Ondansetron (or other 5HT-3)         Postoperative Care  Postoperative pain management:  IV analgesics and Multi-modal analgesia.      Consents  Anesthetic plan, risks, benefits and alternatives discussed with:  Patient..                 Ld Purcell MD

## 2020-05-13 NOTE — PROGRESS NOTES
Pre procedure plan of care reviewed with pt and daughter via phone. Pt requests that daughter serve as  over the phone. All questions answered and pt appears to accept and understand. ALIYAH and Dr Altamirano in to see and consent pt pre procedure.

## 2020-05-13 NOTE — DISCHARGE INSTRUCTIONS
A Fib Ablation Discharge Instructions    After you go home:    Have an adult stay with you until tomorrow.    You may eat your normal diet, unless your doctor tells you otherwise.    RELAX and take it easy for 3 days.        For 24-48 hours (due to the sedation you received):    DO NOT DRIVE FOR 2 DAYS!     Do NOT make any important or legal decisions.    Do NOT drive or operate machines at home or at work.    Do NOT drink alcohol.    Care of Puncture Site:    Check the puncture site every 1-2 hours while awake.    For 2-3 days, when you cough, sneeze, laugh or move your bowels, hold your hand over the puncture site and press firmly.    Change band aid daily for at least 3 days. If there is minor oozing, apply another band aid and remove it after 12 hours.     It is normal to have a small bruise or pea size lump at the site.    You may shower. Do NOT take a bath, or use a hot tub or pool until groin site heals, which may take up to a week.  Do NOT scrub the site. Do not use lotion or powder near the puncture site.    Activity:    Do NOT lift, push or pull more than 10 pounds (equal to a gallon of milk) for 3 days.    NO repetitive motions such as loading , vacuuming, raking, shoveling.     Bleeding:    If you start bleeding from the groin site, lie down flat and press firmly on the site for 10 minutes or until bleeding stops.     Once bleeding stops, lay flat for 1-2 hours.    Call your A Fib nurse if bleeding does not stop or after hours will need to go to ER.       Go to ER or Call 911 right away if you have heavy bleeding or bleeding that does not stop.    Medications:    Take your medications, including blood thinners, unless your doctor tells you not to.    If you have stopped any other medicines, check with your nurse or provider about when to restart them.    If you have pain, you may takeTylenol (acetaminophen) and if this does not help may take Advil (ibuprofen-400 mg with food).    Call the A Fib  RN if:    Chest pain not relieved by tylenol or ibuprofen    Difficulty swallowing and/or coughing up blood    Shortness of breath    Increased groin pain or a large or growing hard lump around the site.    Groin site is red, swollen, hot or tender.    Blood or fluid is draining from the groin site.    You have chills or a fever greater than 101 F (38 C).    Your leg feels numb, cool or changes color.    If groin pain is not relieved by Tylenol or Ibuprofen.    Recurrent irregular or fast heart rate lasting over 2-3 hours.    Any questions or concerns.    Heart rhythms:  You may have some irregular heartbeats. These feel very strong. They may make you feel that the A Fib is going to start again.  Give it time. The irregular beats should occur less often.    Follow Up Appointments:    Caridad Rowland 5/22 at 2:30  Video visit    Dr Altamirano will be the first week in August when schedule is open.      [facility]   A Fib clinic RN's Tricia Bush [phone] (Mon-Fri, 8:00-5:00)   [phone] Option 2 (7 days a week) after hours for on call Cardiologist.

## 2020-05-13 NOTE — ANESTHESIA CARE TRANSFER NOTE
Patient: Robert Felix    Procedure(s):  EP Ablation Focal AFIB    Diagnosis: sx'c paroxysmal AFib  Diagnosis Additional Information: No value filed.    Anesthesia Type:   General     Note:  Airway :Face Mask  Patient transferred to:PACU  Comments: Neuromuscular blockade reversed after TOF 4/4, spontaneous respirations, adequate tidal volumes, followed commands to voice, oropharynx suctioned with soft flexible catheter, extubated atraumatically, extubated with suction, airway patent after extubation.  Oxygen via facemask at 6 liters per minute to PACU. After extubation, patient remained in OR for 14 minutes until transport to PACU due to standard hospital COVID-19 precautions, Oxygen tubing connected to wall O2 in PACU, SpO2, NiBP, and EKG monitors and alarms on and functioning, Cary Hugger warmer connected to patient gown, report on patient's clinical status given to PACU RN.   Handoff Report: Identifed the Patient, Identified the Reponsible Provider, Reviewed the pertinent medical history, Discussed the surgical course, Reviewed Intra-OP anesthesia mangement and issues during anesthesia, Set expectations for post-procedure period and Allowed opportunity for questions and acknowledgement of understanding      Vitals: (Last set prior to Anesthesia Care Transfer)    CRNA VITALS  5/13/2020 1159 - 5/13/2020 1236      5/13/2020             Pulse:  76    SpO2:  98 %    Resp Rate (set):  10                Electronically Signed By: ALE Osborne CRNA  May 13, 2020  12:36 PM

## 2020-05-13 NOTE — ANESTHESIA POSTPROCEDURE EVALUATION
Patient: Robert Felix    Procedure(s):  EP Ablation Focal AFIB    Diagnosis:sx'c paroxysmal AFib  Diagnosis Additional Information: No value filed.    Anesthesia Type:  General    Note:  Anesthesia Post Evaluation    Patient location during evaluation: PACU  Patient participation: Able to fully participate in evaluation  Level of consciousness: awake  Pain management: adequate  Airway patency: patent  Cardiovascular status: acceptable  Respiratory status: acceptable  Hydration status: acceptable  PONV: none             Last vitals:  Vitals:    05/13/20 1330 05/13/20 1345 05/13/20 1400   BP: 119/79 124/87 136/85   Pulse: 72     Resp: 15 20 20   Temp:      SpO2:  96%          Electronically Signed By: Ld Purcell MD  May 13, 2020  2:35 PM

## 2020-05-13 NOTE — PROGRESS NOTES
Care Suites Post Procedure Note    Patient Information  Name: Robert Felix  Age: 64 year old    Post Procedure  Time patient returned to Care Suites: 1345  Concerns/abnormal assessment: Urinary retention  If abnormal assessment, provider notified:  Yes  Plan/Other: Continue post procedure plan of care    Bilateral groin sites CDI, no hematoma. Pulses are present.  VS stable, c/o urinary retention sx of fullness, uncomfortable per pt. Abraham drains without difficulty.     Anticipate discontinue abraham at 1600  Anticipate bedrest until 1630  Anticipate discharge after 1730 if stable.  Discharge instruction is given to pt and to daughter.   All questions are answered.    Dr. Altamirano was here to see pt and requested pt be discharged at 1800 and voiding without difficulty.     1630 Pt was off bedrest, up to the bathroom and voiding small amount. Pt stated he felt much better.    [id number] Pt has been up ambulating, voiding several times. Pt is ready to be discharged.     Encourage po fluids, ambulating.       Lynne Murray RN     Care Suites Discharge Nursing Note    Patient Information  Name: Robert Felix  Age: 64 year old    Discharge Education:  Discharge instructions reviewed: yes  Additional education/resources provided:NA  Patient/patient representative verbalizes understanding:  yes Patient discharging on new medications:  Yes  Medication education completed:  yes  Discharge Plans:   Discharge location:  Door 2  Discharge ride contacted: pt's daughter Tyra  [phone]  Approximate discharge time: 1800    Discharge Criteria:  Discharge criteria met and vital signs stable:  Yes    Patient Belongs:  Patient belongings returned to patient:  Yes    Lynne Murray RN

## 2020-05-13 NOTE — PRE-PROCEDURE
GENERAL PRE-PROCEDURE:   Procedure:  JOSELITO  Date/Time:  5/13/2020 9:40 AM    Risks and benefits: Risks, benefits and alternatives were discussed    DC Plan: Appropriate discharge home plan in place for patients who are going home after procedure   Consent given by:  Patient  Patient states understanding of procedure being performed: Yes    Patient's understanding of procedure matches consent: Yes    Procedure consent matches procedure scheduled: Yes    Expected level of sedation:  Deep  Appropriately NPO:  Yes  ASA Class:  Class 1- healthy patient (Patient already intubated by anesthesiologist)  : Airway assessment by anesthesiologist at time of endotracheal intubation.  Lungs:  Lungs clear with good breath sounds bilaterally  Heart:  Normal heart sounds and rate  History & Physical reviewed:  History and physical reviewed and no updates needed  Statement of review:  I have reviewed the lab findings, diagnostic data, medications, and the plan for sedation

## 2020-05-13 NOTE — PRE-PROCEDURE
GENERAL PRE-PROCEDURE:   Procedure:  Afib ablation  Date/Time:  5/13/2020 8:33 AM    Written consent obtained?: Yes    Risks and benefits: Risks, benefits and alternatives were discussed    Consent given by:  Patient  Patient states understanding of procedure being performed: Yes    Patient's understanding of procedure matches consent: Yes    Procedure consent matches procedure scheduled: Yes    Expected level of sedation:  Moderate  Appropriately NPO:  Yes  ASA Class:  Class 2- mild systemic disease, no acute problems, no functional limitations  Mallampati  :  Grade 2- soft palate, base of uvula, tonsillar pillars, and portion of posterior pharyngeal wall visible  Lungs:  Lungs clear with good breath sounds bilaterally  Heart:  Normal heart sounds and rate  History & Physical reviewed:  History and physical reviewed and no updates needed  Statement of review:  I have reviewed the lab findings, diagnostic data, medications, and the plan for sedation

## 2020-05-13 NOTE — PROGRESS NOTES
PATIENT WELLNESS SCREENING    Step 1: Answer all screening questions 1-3.    1. In the last month, have you been in contact with someone who was confirmed or suspected to have Coronavirus/COVID-19? No    2. Do you have the following symptoms?   Fever? No   Cough? No   Shortness of breath? No   Skin rash? No    Step 2: Refer to logic grid below for actions    NO SYMPTOM(S)    ACTIONS:  1. Standard rooming process  2. Provider to assess per normal protocol    POSITIVE SYMPTOM(S)  If positive for ANY of the following symptoms: fever, cough, shortness of breath, rash    ACTIONS  1. Mask patient  2. Room patient as soon as possible  3. Don appropriate PPE when entering room  4. Provider evaluation

## 2020-05-14 ENCOUNTER — TELEPHONE (OUTPATIENT)
Dept: CARDIOLOGY | Facility: CLINIC | Age: 65
End: 2020-05-14

## 2020-05-14 NOTE — TELEPHONE ENCOUNTER
5/14 Called pts daughter Tyra, about discharge instructions post A Fib Ablation.  She was  made aware that all instructions, appointments and phone numbers have been included in AVS, that will be given to pt on discharge by Nurse. Reminded that there is no driving for 2 days and no lifting, pushing or pulling of more than 10 pounds for 3 days. Reminded to call with any concerns or problems including, coughing up blood, difficulty swallowing, groin bleed or swelling, increased shortness of breath, fever greater than 101, A Fib lasting longer than 2-3 hours. Made aware to call A Fib RN, if follow up appointments need to be changed. Reminded of f/u appt on 5/22 with Caridad Rowland KARO for a video visit. Tyra expressed understanding and has no questions at this time. [place] 115 pm

## 2020-05-15 LAB
INTERPRETATION ECG - MUSE: NORMAL
INTERPRETATION ECG - MUSE: NORMAL

## 2020-05-22 ENCOUNTER — VIRTUAL VISIT (OUTPATIENT)
Dept: CARDIOLOGY | Facility: CLINIC | Age: 65
End: 2020-05-22
Payer: COMMERCIAL

## 2020-05-22 VITALS
BODY MASS INDEX: 25.73 KG/M2 | DIASTOLIC BLOOD PRESSURE: 72 MMHG | WEIGHT: 190 LBS | SYSTOLIC BLOOD PRESSURE: 137 MMHG | HEART RATE: 73 BPM | HEIGHT: 72 IN

## 2020-05-22 DIAGNOSIS — I48.0 PAROXYSMAL ATRIAL FIBRILLATION (H): Primary | ICD-10-CM

## 2020-05-22 DIAGNOSIS — Z86.79 HISTORY OF WOLFF-PARKINSON-WHITE (WPW) SYNDROME: ICD-10-CM

## 2020-05-22 DIAGNOSIS — Q23.81 BICUSPID AORTIC VALVE: ICD-10-CM

## 2020-05-22 PROCEDURE — 99214 OFFICE O/P EST MOD 30 MIN: CPT | Mod: 95 | Performed by: NURSE PRACTITIONER

## 2020-05-22 RX ORDER — FLECAINIDE ACETATE 50 MG/1
75 TABLET ORAL 2 TIMES DAILY
Qty: 90 TABLET | Refills: 3 | Status: SHIPPED | OUTPATIENT
Start: 2020-05-22 | End: 2020-09-02

## 2020-05-22 ASSESSMENT — MIFFLIN-ST. JEOR: SCORE: 1699.83

## 2020-05-22 NOTE — PROGRESS NOTES
"Robert Felix is a 64 year old male who is being evaluated via a billable video visit.      The patient has been notified of following:     \"This video visit will be conducted via a call between you and your physician/provider. We have found that certain health care needs can be provided without the need for an in-person physical exam.  This service lets us provide the care you need with a video conversation.  If a prescription is necessary we can send it directly to your pharmacy.  If lab work is needed we can place an order for that and you can then stop by our lab to have the test done at a later time.    Video visits are billed at different rates depending on your insurance coverage.  Please reach out to your insurance provider with any questions.    If during the course of the call the physician/provider feels a video visit is not appropriate, you will not be charged for this service.\"    Patient has given verbal consent for Video visit? Yes    How would you like to obtain your AVS? Mail a copy    Patient would like the video invitation sent by: Text to cell phone: [phone]    Will anyone else be joining your video visit? No      Spoke with pt on the phone; pt reported the following:  BP: 137/72  Pulse: 73 bpm  Wt: 190 lbs    Review Of Systems  Skin: NEGATIVE  Eyes:Ears/Nose/Throat: NEGATIVE  Respiratory: NEGATIVE  Cardiovascular: NEGATIVE  Gastrointestinal: NEGATIVE  Genitourinary:NEGATIVE   Musculoskeletal: NEGATIVE  Neurologic: NEGATIVE  Psychiatric: NEGATIVE  Hematologic/Lymphatic/Immunologic: NEGATIVE  Endocrine:  NEGATIVE    Reviewed by YOLA Lozada, 5/22/20    Video-Visit Details    Type of service:  Video Visit    Video Start Time: 2:35 PM  Video End Time: 2:48 PM    Originating Location (pt. Location): Home    Distant Location (provider location):  [facility]     Platform used for Video Visit: [place]      EP KARO NOTE:  This visit was completed via " video due to COVID-19 precautions.  I had the pleasure evaluating Robert Felix during a video visit today.  His daughter, Tyra who also is a nurse is acting as our  today.    The patient is a 64-year-old delightful gentleman with the following chronic medical issues:  1. Paroxysmal atrial fibrillation-symptomatic.  Started on flecainide 2017, stopped 12/2019 for unknown reason, but restarted and proceeded with pulmonary vein isolation with Dr. Altamirano 5/13/2020  2. History of WPW with an ablation in 1992  3. Bicuspid aortic valve with mild aortic root enlargement(4 cm) on echo 6/2019    At this time the patient is feeling well.  He denies chest pain, palpitations, fluttering.  His bilateral femoral access sites have 2 small nodules which have reassured the patient is normal.  They are nontender and have not gotten larger.   As mentioned above he underwent a cavotricuspid isthmus flutter ablation, and pulmonary vein isolation with Dr. Altamirano.  The patient is feeling well and has had no reoccurrence.  I do not have an EKG for today's visit.    PHYSICAL EXAMINATION:  General:  no apparent distress, normal body habitus, sitting upright.  ENT/Mouth:  no nasal discharge.  Normal head shape, no apparent injury or laceration.  Eyes:  normal conjunctivae.  No observed jaundice.  Neck:  no apparent neck swelling.   Chest/Lungs:  no breathing difficulty while speaking.  No audible wheezing.  No cough during conversation.  Cardiovascular:  reported HR is regular.  No obviously elevated jugular venous pressure.  No apparent edema in LE.   Extremities:  no apparent cyanosis.  Skin:  no xanthelasma.  No facial lacerations.  Neurologic:  Normal arm motion bilateral, no tremors.    Psychiatric:  Alert and oriented x3, calm demeanor  The rest of the comprehensive physical examination is deferred due to public health emergency video visit restrictions.       DIAGNOSTIC STUDIES:  5/13/2020: EKG sinus rhythm with slight QTc  prolongation at 479 ms (451 ms).  QRS stable at 90 ms    Echo shows normal ejection fraction with stable bicuspid aortic valve is noted above.    IMPRESSION:  1. Symptomatic persistent atrial fibrillation on flecainide therapy.  Now status post CTI and PVI ablation.  Patient remains on flecainide 75 mg twice daily, metoprolol ER 25 mg daily, and Eliquis 5 mg twice daily.  2. History of WPW with previous ablation in 1992  3. Stable bicuspid aortic valve    RECOMMENDATIONS:  1. Flecainide refilled and sent to his [facility]  2. Restrictions lifted and patient is to increase his activity as tolerated  3. See Dr. Altamirano in August with a twelve-lead EKG.  4. Please call with any questions or concerns prior to that appointment.    As always thank you for including us in his care.    Caridad Rowland, NP, APRN CNP

## 2020-05-22 NOTE — LETTER
"5/22/2020    Physician No Ref-Primary  No address on file    RE: Robert Felix       Dear Colleague,    I had the pleasure of seeing Robert Felix in the [facility].    Robert Felix is a 64 year old male who is being evaluated via a billable video visit.      The patient has been notified of following:     \"This video visit will be conducted via a call between you and your physician/provider. We have found that certain health care needs can be provided without the need for an in-person physical exam.  This service lets us provide the care you need with a video conversation.  If a prescription is necessary we can send it directly to your pharmacy.  If lab work is needed we can place an order for that and you can then stop by our lab to have the test done at a later time.    Video visits are billed at different rates depending on your insurance coverage.  Please reach out to your insurance provider with any questions.    If during the course of the call the physician/provider feels a video visit is not appropriate, you will not be charged for this service.\"    Patient has given verbal consent for Video visit? Yes    How would you like to obtain your AVS? Mail a copy    Patient would like the video invitation sent by: Text to cell phone: [phone]    Will anyone else be joining your video visit? No      Spoke with pt on the phone; pt reported the following:  BP: 137/72  Pulse: 73 bpm  Wt: 190 lbs    Review Of Systems  Skin: NEGATIVE  Eyes:Ears/Nose/Throat: NEGATIVE  Respiratory: NEGATIVE  Cardiovascular: NEGATIVE  Gastrointestinal: NEGATIVE  Genitourinary:NEGATIVE   Musculoskeletal: NEGATIVE  Neurologic: NEGATIVE  Psychiatric: NEGATIVE  Hematologic/Lymphatic/Immunologic: NEGATIVE  Endocrine:  NEGATIVE    Reviewed by YOLA Lozada, 5/22/20    Video-Visit Details    Type of service:  Video Visit    Video Start Time: 2:35 PM  Video End Time: 2:48 PM    Originating " Location (pt. Location): Home    Distant Location (provider location):  [facility]     Platform used for Video Visit: [employer] KARO NOTE:  This visit was completed via video due to COVID-19 precautions.  I had the pleasure evaluating Robert Felix during a video visit today.  His daughter, Tyra who also is a nurse is acting as our  today.    The patient is a 64-year-old delightful gentleman with the following chronic medical issues:  1. Paroxysmal atrial fibrillation-symptomatic.  Started on flecainide 2017, stopped 12/2019 for unknown reason, but restarted and proceeded with pulmonary vein isolation with Dr. Altamirano 5/13/2020  2. History of WPW with an ablation in 1992  3. Bicuspid aortic valve with mild aortic root enlargement(4 cm) on echo 6/2019    At this time the patient is feeling well.  He denies chest pain, palpitations, fluttering.  His bilateral femoral access sites have 2 small nodules which have reassured the patient is normal.  They are nontender and have not gotten larger.   As mentioned above he underwent a cavotricuspid isthmus flutter ablation, and pulmonary vein isolation with Dr. Altamirano.  The patient is feeling well and has had no reoccurrence.  I do not have an EKG for today's visit.    PHYSICAL EXAMINATION:  General:  no apparent distress, normal body habitus, sitting upright.  ENT/Mouth:  no nasal discharge.  Normal head shape, no apparent injury or laceration.  Eyes:  normal conjunctivae.  No observed jaundice.  Neck:  no apparent neck swelling.   Chest/Lungs:  no breathing difficulty while speaking.  No audible wheezing.  No cough during conversation.  Cardiovascular:  reported HR is regular.  No obviously elevated jugular venous pressure.  No apparent edema in LE.   Extremities:  no apparent cyanosis.  Skin:  no xanthelasma.  No facial lacerations.  Neurologic:  Normal arm motion bilateral, no tremors.    Psychiatric:  Alert and  oriented x3, calm demeanor  The rest of the comprehensive physical examination is deferred due to public health emergency video visit restrictions.       DIAGNOSTIC STUDIES:  5/13/2020: EKG sinus rhythm with slight QTc prolongation at 479 ms (451 ms).  QRS stable at 90 ms    Echo shows normal ejection fraction with stable bicuspid aortic valve is noted above.    IMPRESSION:  1. Symptomatic persistent atrial fibrillation on flecainide therapy.  Now status post CTI and PVI ablation.  Patient remains on flecainide 75 mg twice daily, metoprolol ER 25 mg daily, and Eliquis 5 mg twice daily.  2. History of WPW with previous ablation in 1992  3. Stable bicuspid aortic valve    RECOMMENDATIONS:  1. Flecainide refilled and sent to his [facility] pharmacy  2. Restrictions lifted and patient is to increase his activity as tolerated  3. See Dr. Altamirano in August with a twelve-lead EKG.  4. Please call with any questions or concerns prior to that appointment.    As always thank you for including us in his care.    Caridad Rowland NP, APRN CNP                  Thank you for allowing me to participate in the care of your patient.      Sincerely,     Caridad Rowland NP, APRN CNP     [facility] [place]    cc:   No referring provider defined for this encounter.

## 2020-05-22 NOTE — PATIENT INSTRUCTIONS
Call my nurse with any questions or concerns:  [phone]  *If you have concerns after hours, please call [phone], option 2 to speak with on call Cardiologist.    Continue your medication until your visit with Dr. Altamirano in August.  My  will contact you to set up this appointment.    If you have any questions or concerns please call us.  Okay to increase her activity as tolerated.    It was nice meeting you today.  Caridad

## 2020-06-08 ENCOUNTER — TELEPHONE (OUTPATIENT)
Dept: CARDIOLOGY | Facility: CLINIC | Age: 65
End: 2020-06-08

## 2020-06-08 NOTE — TELEPHONE ENCOUNTER
Daughter calling and reporting pt is in [place] and needs Dr Altamirano to call insurance company for prior auth. Will inform afib RNS.

## 2020-06-09 NOTE — TELEPHONE ENCOUNTER
PA Initiation    Medication: Eliquis 5MG -   Insurance Company: [employer] - Phone [phone] Fax [phone]  Pharmacy Filling the Rx: [facility] - [address] [facility]  Filling Pharmacy Phone: [phone]  Filling Pharmacy Fax: [phone]  Start Date: 6/9/2020

## 2020-06-09 NOTE — TELEPHONE ENCOUNTER
Pt is needing a prior auth for his Eliquis 5 mg bid.  Will send as urgent as pt will be out by Saturday. It may be that d/t pt insurance Eliquis is not on his formulary, this may need to be changed. Cassius

## 2020-06-10 NOTE — TELEPHONE ENCOUNTER
"PRIOR AUTHORIZATION DENIED    Medication: Eliquis 5MG - DENIED    Denial Date: 6/9/2020    Denial Rational:     Patient must have a history of trial & failure to or have a contraindication and/or intolerance 2 of the formulary alternative(s).  Formulary Alternative(s):  Pradaxa  Xarelto (10MG, 15MG, 20MG)  Enoxaparin Sodium Vial  Enoxaparin Syringe  Gragmin Vial  Jantoven  Warfarin          Appeal Information:     **Please advise if appeal is necessary and place a letter of medical necessity with clinical rationale under the \"letters\" tab in patient's chart and route back to [facility] [[id number]]        "

## 2020-06-11 ENCOUNTER — TELEPHONE (OUTPATIENT)
Dept: CARDIOLOGY | Facility: CLINIC | Age: 65
End: 2020-06-11

## 2020-06-11 DIAGNOSIS — I48.0 PAROXYSMAL ATRIAL FIBRILLATION (H): ICD-10-CM

## 2020-06-11 NOTE — TELEPHONE ENCOUNTER
Daughter called requesting refill of eliquis be sent to [facility] pharmacy despite PA being denied.  Provided for her suggestions to see if other AC medications were covered.  She will call [employer] to check cost and if extremely high she will check with insurance and call us back.  VANGIE Leo

## 2020-06-17 NOTE — TELEPHONE ENCOUNTER
Spoke to Tyra who states that pt has run out of Eliquis on Friday 6/12.  Pt is s/t A Fib Ablation on 5/13. Pt insurance does not cover Eliquis and the cost is $500/mo.  Pt may try Xarelto instead, which is covered.  Will message Dr Altamirano with ok to change.  Will then call pt and discuss changing.  Daughter stated that he had said he did not want to change before leaving to [place].  Pt will be there until August. Cassius

## 2020-06-17 NOTE — TELEPHONE ENCOUNTER
Spoke to pt and made him aware that it is recommended that pt stay on a blood thinner and that per his insurance can use Xarelto 20 mg daily. Pt asked if he could take Garlic and informed pt that would not work.  Pt states that he will take, but that need to talk to daughter Tyra. LM for Tyra to call back. Cassius

## 2020-06-24 NOTE — TELEPHONE ENCOUNTER
daughter Tyra called and requesting new AC script be sent to [facility] pharmacy in [place].  Medication list updated in epic.  Daughter will call her dad to let him know that it has been sent to the pharmacy.  VANGIE Leo

## 2020-07-08 NOTE — TELEPHONE ENCOUNTER
Pt daughter Tyra called and stated that pt was unable to  the Xarelto d/t his insurance. Asked to send the escript to [facility] [place]. Escript sent and LM for Tyra that it has been sent. Cassius

## 2020-08-06 ENCOUNTER — TELEPHONE (OUTPATIENT)
Dept: CARDIOLOGY | Facility: CLINIC | Age: 65
End: 2020-08-06

## 2020-08-06 NOTE — TELEPHONE ENCOUNTER
PATIENT WELLNESS TELEPHONE SCREENING     Step 1 Screening Questions    In the past 3 weeks, have you been exposed to someone with a suspected or known illness?  COVID-19? No  Chickenpox? No   Measles? No  Pertussis? No    In the past 2 weeks, have you had any of the following symptoms?   Fever/Chills? No   Cough? No   Shortness of breath? No   New loss of taste or smell? No  Sore throat? No  Muscle or body aches? No  Headaches? No  Fatigue? No  Vomiting or diarrhea? No    Step 2 Screening Results (Skip if the patient is negative for symptoms)    If the patient is positive for new or worsening symptoms, contact the ordering provider to determine if the procedure is deemed necessary. Determine if patient can be re-scheduled when the patient is symptom free or has a negative COVID test.     If ordering provider deems the procedure is necessary, notify your manager/supervisor. Provide the patient with the procedural department phone number and inform the patient to call the procedural department upon arrival.  The patient will be registered over the phone.    Step 3 Review Visitor Policy  Patient informed of the updated visitor policy   1 visitor allowed per patient   Visitor must screen negative for COVID symptoms   Visitor must wear a mask    Leia Cedillo

## 2020-08-07 ENCOUNTER — HOSPITAL ENCOUNTER (OUTPATIENT)
Dept: CARDIOLOGY | Facility: CLINIC | Age: 65
Discharge: HOME | End: 2020-08-07
Attending: INTERNAL MEDICINE | Admitting: INTERNAL MEDICINE
Payer: COMMERCIAL

## 2020-08-07 DIAGNOSIS — I48.0 PAROXYSMAL ATRIAL FIBRILLATION (H): ICD-10-CM

## 2020-08-07 PROCEDURE — 0298T ZIO PATCH HOLTER ADULT PEDIATRIC GREATER THAN 48 HRS: CPT | Performed by: INTERNAL MEDICINE

## 2020-08-07 PROCEDURE — 0296T ZIO PATCH HOLTER ADULT PEDIATRIC GREATER THAN 48 HRS: CPT

## 2020-08-10 ENCOUNTER — TELEPHONE (OUTPATIENT)
Dept: CARDIOLOGY | Facility: CLINIC | Age: 65
End: 2020-08-10

## 2020-08-10 DIAGNOSIS — I48.0 PAROXYSMAL ATRIAL FIBRILLATION (H): Primary | ICD-10-CM

## 2020-08-10 NOTE — TELEPHONE ENCOUNTER
Spoke to daughter Tyra who was inquiring about follow up appt with Dr Altamirano s/p afib ablation 5/13.  Recently had zio patch placed on 8/7 for one week.  Had 3 month follow up appt scheduled for 8/20.  Needed to move this appt to later date as we will not have results back from zio patch.  Moved appt to 9/2 (video visit).  Patient will be in [place] with his other daughter Judy.  Hoala provided us with her number for video visit ([phone].  Would like to get EKG prior to patient going to visit daughter in [place].  Tyra will set this up as soon as she knows when he will be leaving.  VANGEI Leo

## 2020-08-12 NOTE — TELEPHONE ENCOUNTER
8/12/20 Spoke with daughter Tyra who stated pt is leaving to go out of town on 8/20 . Would like to schedule EKG for 8/19. Scheduled for 1030 am on that day. All in agreement w bernard Briggs 3 pm

## 2020-08-18 ENCOUNTER — TELEPHONE (OUTPATIENT)
Dept: CARDIOLOGY | Facility: CLINIC | Age: 65
End: 2020-08-18

## 2020-08-18 NOTE — TELEPHONE ENCOUNTER

## 2020-08-19 DIAGNOSIS — I48.0 PAROXYSMAL ATRIAL FIBRILLATION (H): ICD-10-CM

## 2020-08-19 PROCEDURE — 93000 ELECTROCARDIOGRAM COMPLETE: CPT | Performed by: INTERNAL MEDICINE

## 2020-08-19 NOTE — PROGRESS NOTES
EKG performed per provider order.  Results within normal limits-OK to leave and it will be discussed during video visit 9/2/2020 with Dr. Altamirano.  YOLA Krause

## 2020-09-02 ENCOUNTER — VIRTUAL VISIT (OUTPATIENT)
Dept: CARDIOLOGY | Facility: CLINIC | Age: 65
End: 2020-09-02
Attending: INTERNAL MEDICINE
Payer: COMMERCIAL

## 2020-09-02 ENCOUNTER — TELEPHONE (OUTPATIENT)
Dept: CARDIOLOGY | Facility: CLINIC | Age: 65
End: 2020-09-02

## 2020-09-02 DIAGNOSIS — I48.0 PAROXYSMAL ATRIAL FIBRILLATION (H): ICD-10-CM

## 2020-09-02 PROCEDURE — 99213 OFFICE O/P EST LOW 20 MIN: CPT | Mod: 95 | Performed by: INTERNAL MEDICINE

## 2020-09-02 RX ORDER — METOPROLOL SUCCINATE 25 MG/1
12.5 TABLET, EXTENDED RELEASE ORAL DAILY
Qty: 90 TABLET | Refills: 3 | Status: SHIPPED | OUTPATIENT
Start: 2020-09-02 | End: 2020-10-05

## 2020-09-02 NOTE — LETTER
9/2/2020    Physician No Ref-Primary  No address on file    RE: Robert Felix       Dear Colleague,    I had the pleasure of seeing Robert Felix in the [facility].    Electrophysiology/ Virtual Clinic Note         H&P and Plan:   Patient opted to have a virtual visit due to ongoing issues related to ongoing COVID-19 virus pandemic and to minimize social interaction (based on CDC guidelines).      Visit details:  Patient has given verbal consent for this visit.    Interview was done talking and seeing patient via Internet.    Mode of transmission: Jatinder Aguiar.  Visit start time: 11:15 AM  Visit stop time: 11:30 AM  Provider location: Office.  Patient location: Home.       REASON FOR VISIT: Electrophysiology evaluation.      HISTORY OF PRESENT ILLNESS: 64-year-old gentleman with a  history of  Brian-Parkinson-White syndrome (3 ablations in the past), bicuspid aortic valve with mild aortic root enlargement and paroxysmal atrial fibrillation/ flutter, who failed rhythm control strategy with flecainide and metoprolol.  He underwent ablation on 5/13/2020 and is here today for routine follow-up.      Today, he informs he is doing well.  He has had minimal breakthroughs after ablation.  Prior to ablation he was having frequent episodes of A. fib on a daily basis despite of flecainide therapy.    He also claims that he tolerated ablation well.  He has no complaints during this visit and denies any sense of chest pain, shortness of breath, lightheadedness, near-syncope or syncope.    The ZIO Patch monitor was obtained in August (8/07/2020) and showed 1 brief run of paroxysmal SVT (4 beats only).  EKG done in August showed normal sinus rhythm with QRS measuring 94 ms.    Previous studies:  - JOSELITO (5/13/2020): Normal V function.  No presence of PETER clots.  -CTA (5/11/2020): Normal pulmonary vein anatomy.  Mild dilated aortic lobe (4 cm).  7 mm pulmonary nodule in the anterior right  lung base.  Repeat CT in 6 months is recommended.  -EKG stress test (3/18/2020): Negative for ischemia, arrhythmia or QRS widening.      ASSESSMENT AND PLAN:   1.    Paroxysmal atrial fibrillation.    Patient failed therapy with flecainide however he responded well to ablation.  He is interested in stopping all medications.  I recommend the following:   -Stop flecainide.    -Stop Xarelto.    -Change metoprolol to current dose.    -Follow-up with an KARO in a month.  If no recurrence of A. fib, we may consider stopping metoprolol.       2.  Embolic prevention.  CHADS-VASc score of zero.    Plan as detailed above.  3.  Lung nodule.  Patient will follow-up with PCP to schedule CT.      Javier Altamirano MD    Physical Exam:  Vitals: There were no vitals taken for this visit.    Constitutional:  Pt is in no acute distress.  Normal body habitus, upright.  HEAD: Normocephalic. No evidence of injury or laceration.   SKIN: Skin normal color with no lesions or eruptions. No xanthelasma.  ENT/Mouth:  Membranes moist. No nasal discharge or bleeding gums.   Neck:  Supple, normal JVP, no evidence of thyromegaly.  Chest/Lungs: No audible wheezing or labored breathing. Normal chest wall expansion. No cough.   Cardiovascular: Normal jugular venous pressure. No evidence of pitting edema bilaterally.   Abdomen: No evidence of abdominal distention. No observed jaundice.  Eyes: PERRL, EOMI. No scleral icterus, normal conjunctivae.  Extremities:  No lower extremity edema noticed.  No cyanosis or clubbing noted.   Neurologic: Normal arm motion bilateral.  No tremors. No evidence of focal defect.   Psychiatric: Alert and oriented x3, calm.         CURRENT MEDICATIONS:  Current Outpatient Medications   Medication Sig Dispense Refill     clobetasol (TEMOVATE) 0.05 % cream APPLY 1 APPLICATION TO SKIN ONCE A DAY AS NEEDED (HAND DERMATITIS).  3     flecainide (TAMBOCOR) 50 MG tablet Take 1.5 tablets (75 mg) by mouth 2 times daily 90 tablet 3      metoprolol succinate ER (TOPROL-XL) 25 MG 24 hr tablet Take 0.5 tablets (12.5 mg) by mouth daily 90 tablet 3     rivaroxaban ANTICOAGULANT (XARELTO) 20 MG TABS tablet Take 1 tablet (20 mg) by mouth daily (with dinner) 90 tablet 1       ALLERGIES     Allergies   Allergen Reactions     Aspirin Rash     States this happened 30 years ago and he has not taken any since       PAST MEDICAL HISTORY:  Past Medical History:   Diagnosis Date     Paroxysmal atrial fibrillation (H)      WPW (Brian-Parkinson-White syndrome)        PAST SURGICAL HISTORY:  Past Surgical History:   Procedure Laterality Date     EP ABLATION FOCAL AFIB N/A 5/13/2020    Procedure: EP Ablation Focal AFIB;  Surgeon: Javier Altamirano MD;  Location:  [facility]     EP WPW ABLATION  1992       FAMILY HISTORY:  Family History   Problem Relation Age of Onset     Cancer Mother      Angina Father        SOCIAL HISTORY:  Social History     Socioeconomic History     Marital status:      Spouse name: Not on file     Number of children: Not on file     Years of education: Not on file     Highest education level: Not on file   Occupational History     Not on file   Social Needs     Financial resource strain: Not on file     Food insecurity     Worry: Not on file     Inability: Not on file     Transportation needs     Medical: Not on file     Non-medical: Not on file   Tobacco Use     Smoking status: Never Smoker     Smokeless tobacco: Never Used   Substance and Sexual Activity     Alcohol use: Not on file     Drug use: No     Sexual activity: Not on file   Lifestyle     Physical activity     Days per week: Not on file     Minutes per session: Not on file     Stress: Not on file   Relationships     Social connections     Talks on phone: Not on file     Gets together: Not on file     Attends Confucianism service: Not on file     Active member of club or organization: Not on file     Attends meetings of clubs or organizations: Not on file      Relationship status: Not on file     Intimate partner violence     Fear of current or ex partner: Not on file     Emotionally abused: Not on file     Physically abused: Not on file     Forced sexual activity: Not on file   Other Topics Concern     Parent/sibling w/ CABG, MI or angioplasty before 65F 55M? No   Social History Narrative     Not on file       Review of Systems:  Skin:        Eyes:       ENT:       Respiratory:       Cardiovascular:       Gastroenterology:      Genitourinary:       Musculoskeletal:       Neurologic:       Psychiatric:       Heme/Lymph/Imm:       Endocrine:           Recent Lab Results:  LIPID RESULTS:  Lab Results   Component Value Date    CHOL 207 (H) 06/13/2019    HDL 42 06/13/2019     (H) 06/13/2019    TRIG 111 06/13/2019       LIVER ENZYME RESULTS:  No results found for: AST, ALT    CBC RESULTS:  Lab Results   Component Value Date    WBC 4.6 05/13/2020    RBC 4.51 05/13/2020    HGB 14.3 05/13/2020    HCT 41.3 05/13/2020    MCV 92 05/13/2020    MCH 31.7 05/13/2020    MCHC 34.6 05/13/2020    RDW 12.2 05/13/2020     05/13/2020       BMP RESULTS:  Lab Results   Component Value Date     05/13/2020    POTASSIUM 3.8 05/13/2020    CHLORIDE 110 (H) 05/13/2020    CO2 25 05/13/2020    ANIONGAP 7 05/13/2020     (H) 05/13/2020    BUN 19 05/13/2020    CR 1.05 05/13/2020    GFRESTIMATED 75 05/13/2020    GFRESTBLACK 87 05/13/2020    LAWRENCE 8.7 05/13/2020        A1C RESULTS:  No results found for: A1C    INR RESULTS:  No results found for: INR      ECHOCARDIOGRAM  No results found for this or any previous visit (from the past 8760 hour(s)).      No orders of the defined types were placed in this encounter.    No orders of the defined types were placed in this encounter.    There are no discontinued medications.      Encounter Diagnosis   Name Primary?     Paroxysmal atrial fibrillation (H)          CC  Javier Altamirano MD  [address]  [place]  "[id number]                  Robert Felix is a 65 year old male who is being evaluated via a billable video visit.      The patient has been notified of following:     \"This video visit will be conducted via a call between you and your physician/provider. We have found that certain health care needs can be provided without the need for an in-person physical exam.  This service lets us provide the care you need with a video conversation.  If a prescription is necessary we can send it directly to your pharmacy.  If lab work is needed we can place an order for that and you can then stop by our lab to have the test done at a later time.    Video visits are billed at different rates depending on your insurance coverage.  Please reach out to your insurance provider with any questions.    If during the course of the call the physician/provider feels a video visit is not appropriate, you will not be charged for this service.\"    Patient has given verbal consent for Video visit? Yes  How would you like to obtain your AVS? Mail a copy  If you are dropped from the video visit, the video invite should be resent to: Other e-mail: Katharine   Will anyone else be joining your video visit? No      Video-Visit Details    Type of service:  Video Visit    Video Start Time:   Video End Time:     Originating Location (pt. Location):     Distant Location (provider location):  [facility]     Platform used for Video Visit:               Review Of Systems  Skin: negative  Eyes: negative  Ears/Nose/Throat: negative  Respiratory: No shortness of breath, dyspnea on exertion, cough, or hemoptysis  Cardiovascular: palpitations and A couple episode happened last week only for a few moments   Gastrointestinal: negative  Genitourinary: negative  Musculoskeletal: negative  Neurologic: negative  Psychiatric: negative  Hematologic/Lymphatic/Immunologic: negative  Endocrine: negative    Patient reported " vitals:  BP:123/72  Heart rate:68  Weight:192lb    Mel Davila CMA      Thank you for allowing me to participate in the care of your patient.      Sincerely,     Javier Altamirano MD     [facility] [place]    cc:   Javier Altamirano MD  [address]  [place] [address]

## 2020-09-02 NOTE — TELEPHONE ENCOUNTER
----- Message from Javier Altamirano MD sent at 9/2/2020 11:36 AM CDT -----  Please remind patient that his previous CT showed a 7 mm lung nodule, which she needs to be follow-up with a chest CT.  He should discuss plans with his PCP.    Thank you.    Javier

## 2020-09-02 NOTE — TELEPHONE ENCOUNTER
Called and spoke with daughter and informed her of patients 7 mm pulmonary nodule found on CT. He does not have a PCP but will get one and repeat CT in 6 months is recommended.

## 2020-09-02 NOTE — PROGRESS NOTES
Electrophysiology/ Virtual Clinic Note         H&P and Plan:   Patient opted to have a virtual visit due to ongoing issues related to ongoing COVID-19 virus pandemic and to minimize social interaction (based on CDC guidelines).      Visit details:  Patient has given verbal consent for this visit.    Interview was done talking and seeing patient via Internet.    Mode of transmission: [employer].  Visit start time: 11:15 AM  Visit stop time: 11:30 AM  Provider location: Office.  Patient location: Home.       REASON FOR VISIT: Electrophysiology evaluation.      HISTORY OF PRESENT ILLNESS: 64-year-old gentleman with a  history of  Brian-Parkinson-White syndrome (3 ablations in the past), bicuspid aortic valve with mild aortic root enlargement and paroxysmal atrial fibrillation/ flutter, who failed rhythm control strategy with flecainide and metoprolol.  He underwent ablation on 5/13/2020 and is here today for routine follow-up.      Today, he informs he is doing well.  He has had minimal breakthroughs after ablation.  Prior to ablation he was having frequent episodes of A. fib on a daily basis despite of flecainide therapy.    He also claims that he tolerated ablation well.  He has no complaints during this visit and denies any sense of chest pain, shortness of breath, lightheadedness, near-syncope or syncope.    The ZIO Patch monitor was obtained in August (8/07/2020) and showed 1 brief run of paroxysmal SVT (4 beats only).  EKG done in August showed normal sinus rhythm with QRS measuring 94 ms.    Previous studies:  - JOSELITO (5/13/2020): Normal V function.  No presence of PETER clots.  -CTA (5/11/2020): Normal pulmonary vein anatomy.  Mild dilated aortic lobe (4 cm).  7 mm pulmonary nodule in the anterior right lung base.  Repeat CT in 6 months is recommended.  -EKG stress test (3/18/2020): Negative for ischemia, arrhythmia or QRS widening.      ASSESSMENT AND PLAN:   1.    Paroxysmal atrial fibrillation.    Patient  failed therapy with flecainide however he responded well to ablation.  He is interested in stopping all medications.  I recommend the following:   -Stop flecainide.    -Stop Xarelto.    -Change metoprolol to current dose.    -Follow-up with an KARO in a month.  If no recurrence of A. fib, we may consider stopping metoprolol.       2.  Embolic prevention.  CHADS-VASc score of zero.    Plan as detailed above.  3.  Lung nodule.  Patient will follow-up with PCP to schedule CT.      Javier Altamirano MD    Physical Exam:  Vitals: There were no vitals taken for this visit.    Constitutional:  Pt is in no acute distress.  Normal body habitus, upright.  HEAD: Normocephalic. No evidence of injury or laceration.   SKIN: Skin normal color with no lesions or eruptions. No xanthelasma.  ENT/Mouth:  Membranes moist. No nasal discharge or bleeding gums.   Neck:  Supple, normal JVP, no evidence of thyromegaly.  Chest/Lungs: No audible wheezing or labored breathing. Normal chest wall expansion. No cough.   Cardiovascular: Normal jugular venous pressure. No evidence of pitting edema bilaterally.   Abdomen: No evidence of abdominal distention. No observed jaundice.  Eyes: PERRL, EOMI. No scleral icterus, normal conjunctivae.  Extremities:  No lower extremity edema noticed.  No cyanosis or clubbing noted.   Neurologic: Normal arm motion bilateral.  No tremors. No evidence of focal defect.   Psychiatric: Alert and oriented x3, calm.         CURRENT MEDICATIONS:  Current Outpatient Medications   Medication Sig Dispense Refill     clobetasol (TEMOVATE) 0.05 % cream APPLY 1 APPLICATION TO SKIN ONCE A DAY AS NEEDED (HAND DERMATITIS).  3     flecainide (TAMBOCOR) 50 MG tablet Take 1.5 tablets (75 mg) by mouth 2 times daily 90 tablet 3     metoprolol succinate ER (TOPROL-XL) 25 MG 24 hr tablet Take 0.5 tablets (12.5 mg) by mouth daily 90 tablet 3     rivaroxaban ANTICOAGULANT (XARELTO) 20 MG TABS tablet Take 1 tablet (20 mg) by mouth daily (with  dinner) 90 tablet 1       ALLERGIES     Allergies   Allergen Reactions     Aspirin Rash     States this happened 30 years ago and he has not taken any since       PAST MEDICAL HISTORY:  Past Medical History:   Diagnosis Date     Paroxysmal atrial fibrillation (H)      WPW (Brian-Parkinson-White syndrome)        PAST SURGICAL HISTORY:  Past Surgical History:   Procedure Laterality Date     EP ABLATION FOCAL AFIB N/A 5/13/2020    Procedure: EP Ablation Focal AFIB;  Surgeon: Javier Altamirano MD;  Location:  [facility]     EP WPW ABLATION  1992       FAMILY HISTORY:  Family History   Problem Relation Age of Onset     Cancer Mother      Angina Father        SOCIAL HISTORY:  Social History     Socioeconomic History     Marital status:      Spouse name: Not on file     Number of children: Not on file     Years of education: Not on file     Highest education level: Not on file   Occupational History     Not on file   Social Needs     Financial resource strain: Not on file     Food insecurity     Worry: Not on file     Inability: Not on file     Transportation needs     Medical: Not on file     Non-medical: Not on file   Tobacco Use     Smoking status: Never Smoker     Smokeless tobacco: Never Used   Substance and Sexual Activity     Alcohol use: Not on file     Drug use: No     Sexual activity: Not on file   Lifestyle     Physical activity     Days per week: Not on file     Minutes per session: Not on file     Stress: Not on file   Relationships     Social connections     Talks on phone: Not on file     Gets together: Not on file     Attends Catholic service: Not on file     Active member of club or organization: Not on file     Attends meetings of clubs or organizations: Not on file     Relationship status: Not on file     Intimate partner violence     Fear of current or ex partner: Not on file     Emotionally abused: Not on file     Physically abused: Not on file     Forced sexual activity: Not  "on file   Other Topics Concern     Parent/sibling w/ CABG, MI or angioplasty before 65F 55M? No   Social History Narrative     Not on file       Review of Systems:  Skin:        Eyes:       ENT:       Respiratory:       Cardiovascular:       Gastroenterology:      Genitourinary:       Musculoskeletal:       Neurologic:       Psychiatric:       Heme/Lymph/Imm:       Endocrine:           Recent Lab Results:  LIPID RESULTS:  Lab Results   Component Value Date    CHOL 207 (H) 06/13/2019    HDL 42 06/13/2019     (H) 06/13/2019    TRIG 111 06/13/2019       LIVER ENZYME RESULTS:  No results found for: AST, ALT    CBC RESULTS:  Lab Results   Component Value Date    WBC 4.6 05/13/2020    RBC 4.51 05/13/2020    HGB 14.3 05/13/2020    HCT 41.3 05/13/2020    MCV 92 05/13/2020    MCH 31.7 05/13/2020    MCHC 34.6 05/13/2020    RDW 12.2 05/13/2020     05/13/2020       BMP RESULTS:  Lab Results   Component Value Date     05/13/2020    POTASSIUM 3.8 05/13/2020    CHLORIDE 110 (H) 05/13/2020    CO2 25 05/13/2020    ANIONGAP 7 05/13/2020     (H) 05/13/2020    BUN 19 05/13/2020    CR 1.05 05/13/2020    GFRESTIMATED 75 05/13/2020    GFRESTBLACK 87 05/13/2020    LAWRENCE 8.7 05/13/2020        A1C RESULTS:  No results found for: A1C    INR RESULTS:  No results found for: INR      ECHOCARDIOGRAM  No results found for this or any previous visit (from the past 8760 hour(s)).      No orders of the defined types were placed in this encounter.    No orders of the defined types were placed in this encounter.    There are no discontinued medications.      Encounter Diagnosis   Name Primary?     Paroxysmal atrial fibrillation (H)          CC  Javier Altamirano MD  [address]  [place] [id number]                  Robert Felix is a 65 year old male who is being evaluated via a billable video visit.      The patient has been notified of following:     \"This video visit will be conducted via a call between " "you and your physician/provider. We have found that certain health care needs can be provided without the need for an in-person physical exam.  This service lets us provide the care you need with a video conversation.  If a prescription is necessary we can send it directly to your pharmacy.  If lab work is needed we can place an order for that and you can then stop by our lab to have the test done at a later time.    Video visits are billed at different rates depending on your insurance coverage.  Please reach out to your insurance provider with any questions.    If during the course of the call the physician/provider feels a video visit is not appropriate, you will not be charged for this service.\"    Patient has given verbal consent for Video visit? Yes  How would you like to obtain your AVS? Mail a copy  If you are dropped from the video visit, the video invite should be resent to: Other e-mail: Katharine   Will anyone else be joining your video visit? No      Video-Visit Details    Type of service:  Video Visit    Video Start Time:   Video End Time:     Originating Location (pt. Location):     Distant Location (provider location):  [facility]     Platform used for Video Visit:               Review Of Systems  Skin: negative  Eyes: negative  Ears/Nose/Throat: negative  Respiratory: No shortness of breath, dyspnea on exertion, cough, or hemoptysis  Cardiovascular: palpitations and A couple episode happened last week only for a few moments   Gastrointestinal: negative  Genitourinary: negative  Musculoskeletal: negative  Neurologic: negative  Psychiatric: negative  Hematologic/Lymphatic/Immunologic: negative  Endocrine: negative    Patient reported vitals:  BP:123/72  Heart rate:68  Weight:192lb    Mel Davila CMA    "

## 2020-10-04 NOTE — PROGRESS NOTES
"Robert Felix is a 65 year old male who is being evaluated via a billable video visit.      The patient has been notified of following:     \"This video visit will be conducted via a call between you and your physician/provider. We have found that certain health care needs can be provided without the need for an in-person physical exam.  This service lets us provide the care you need with a video conversation.  If a prescription is necessary we can send it directly to your pharmacy.  If lab work is needed we can place an order for that and you can then stop by our lab to have the test done at a later time.    Video visits are billed at different rates depending on your insurance coverage.  Please reach out to your insurance provider with any questions.    If during the course of the call the physician/provider feels a video visit is not appropriate, you will not be charged for this service.\"    Patient has given verbal consent for Video visit? Yes  How would you like to obtain your AVS? Mail a copy  If you are dropped from the video visit, the video invite should be resent to: Text to cell phone: [phone]  Will anyone else be joining your video visit? No       Vitals - Patient Reported  Weight (Patient Reported): 81.2 kg (179 lb)  Height (Patient Reported): 182.9 cm (6')  BMI (Based on Pt Reported Ht/Wt): 24.28      Review Of Systems  Skin: NEGATIVE  Eyes:Ears/Nose/Throat: NEGATIVE  Respiratory: NEGATIVE  Cardiovascular: Occasional discomfort in chest, not related to activity, palpitations  Gastrointestinal: NEGATIVE  Genitourinary:NEGATIVE  Musculoskeletal: NEGATIVE  Neurologic: NEGATIVE  Psychiatric: NEGATIVE  Hematologic/Lymphatic/Immunologic: NEGATIVE  Endocrine:  NEGATIVE    YOLA Holly    CC:  Atrial Fibrillation    VITALS:  179#    BRIEF HPI:  Robert is a 65 year old yo M who sees Dr. Altamirano for h/o:    1. Paroxysmal AFib - started on flecainide 2017, stopped in 12/2019 for unknown reason but " restarted and later increased. Recurrent AFib, now s/p ablation (PVI, CTI) 5/13/2020  2. WPW - s/p ablation 1992  3. Bicuspid AV with mild root enlargement (4 cm) on echo 6/2019   4. Pulmonary Nodule - noted on CTA 5/2020 prior to AFib ablation. 6 m and 24 m CT rec'd if low risk; CT @ 3,9 and 24 m vs PET/Bx if high risk     Dr. Altamirano saw Robert 9/2020 at which time he was 4 m post PVI/CTI ablation. He was doing quite a bit better. Follow-up ZP 8/2020 showed 1 4-beat run of SVT. No AFib noted. Flecainide was stopped. Xarelto was stopped and metoprolol XL 12.5 mg daily was continued. 1 m follow-up rec'd to see if metoprolol could be stopped. PCP follow-up or pulmonary nodule rec'd.    INTERVAL HISTORY:  Notes that he's had some episodes of palpitations since stopping the flecainide, somewhat similar to what he had before the ablation. Longest episode was ~2 minutes.    No complaints of chest pain, pressure or tightness.  No orthopnea, PND or edema. No change in exercise tolerance or breathing. Overall, he is feeling well. He would like to see how he does off of low-dose metoprolol    DIAGNOSTICS:  EKG 8/21/2020 SR 78 bpm. QRS duration 98 ms on flecainide 75mg BID  ZioPatch 8/2020 with NSR. No AFib. 1 4 beat run of SVT noted.  JOSELITO 5/2020 EF 55-60%. Nl RV. Nl LA size. PETER unusually small/shallow. Biscuspid AV with complete LCC/RCC raphe. Mild aortic root dilatation.  CTA Heart 5/2020 with mild dilated aortic lobe 4cm. 7 mm nodule in R anterior lung base.    REVIEW OF SYSTEMS:  Negative except as noted above     PHYSICAL EXAM:  GEN: NAD. Upright. Normal body habitus.  today is daughter Ольга  ENT/Mouth: Atraumatic and normocephalic  Chest/Lungs: No audible wheezing or cough; equal chest wall expansion. Non-labored breathing  Skin: No visible rashes. Normal skin color  Psychiatric: A/O. Calm demeanor      ASSESSMENT/PLAN:    1. Paroxysmal AFib    Had been having daily episodes despite flecainide 75 mg BID  prior to CTI/PVI 5/2020. Noted almost no breakthrough when he spoke with Dr. Altamirano 9/2020    Off of flecainide 75 mg BID, he's had a few episodes of palpitations, nothing over 2 minutes    CHADSVASc 0. Xarelto 20 mg daily stopped 9/2/2020 at Dr. Altamirano's appt     PLAN:    Discussed continued metoprolol given that he's had some palpitations but he wants to see how he does off of it.     STOP metoprolol XL 12.5 mg daily    Call if palpitations worsen - can add back metoprolol or even flecainide (maybe a lower dose as now s/p ablation)    2. Bicuspid AoV, mild aortic dilatation    JOSELITO 5/2020 with mild aortic root dilatation     PLAN:    2 y echo    3. Pulmonary Nodule    Follow-up rec'd     PLAN:    Reviewed with daughter Ольга. She was not aware       CURRENT MEDICATIONS:  Current Outpatient Medications   Medication Sig Dispense Refill     clobetasol (TEMOVATE) 0.05 % cream APPLY 1 APPLICATION TO SKIN ONCE A DAY AS NEEDED (HAND DERMATITIS).  3         ORDERS PLACED:  Orders Placed This Encounter   Procedures     Follow-Up with Electrophysiologist     EKG 12-lead complete w/read - Clinics     No orders of the defined types were placed in this encounter.    Medications Discontinued During This Encounter   Medication Reason     flecainide (TAMBOCOR) 50 MG tablet Discontinued by another Health Care Provider     rivaroxaban ANTICOAGULANT (XARELTO) 20 MG TABS tablet Therapy completed     metoprolol succinate ER (TOPROL-XL) 25 MG 24 hr tablet          Encounter Diagnoses   Name Primary?     Paroxysmal atrial fibrillation (H) Yes     History of Brian-Parkinson-White (WPW) syndrome      Palpitations      Encounter for screening for other viral diseases      PAF (paroxysmal atrial fibrillation) (H)          ALLERGIES     Allergies   Allergen Reactions     Aspirin Rash     States this happened 30 years ago and he has not taken any since       PAST MEDICAL HISTORY:  Past Medical History:   Diagnosis Date     Paroxysmal atrial  fibrillation (H)      WPW (Brian-Parkinson-White syndrome)        PAST SURGICAL HISTORY:  Past Surgical History:   Procedure Laterality Date     EP ABLATION FOCAL AFIB N/A 5/13/2020    Procedure: EP Ablation Focal AFIB;  Surgeon: Javier Altamirano MD;  Location:  [facility]     EP WPW ABLATION  1992       FAMILY HISTORY:  Family History   Problem Relation Age of Onset     Cancer Mother      Angina Father        SOCIAL HISTORY:  Social History     Socioeconomic History     Marital status:      Spouse name: Not on file     Number of children: Not on file     Years of education: Not on file     Highest education level: Not on file   Occupational History     Not on file   Social Needs     Financial resource strain: Not on file     Food insecurity     Worry: Not on file     Inability: Not on file     Transportation needs     Medical: Not on file     Non-medical: Not on file   Tobacco Use     Smoking status: Never Smoker     Smokeless tobacco: Never Used   Substance and Sexual Activity     Alcohol use: Not on file     Drug use: No     Sexual activity: Not on file   Lifestyle     Physical activity     Days per week: Not on file     Minutes per session: Not on file     Stress: Not on file   Relationships     Social connections     Talks on phone: Not on file     Gets together: Not on file     Attends Yarsanism service: Not on file     Active member of club or organization: Not on file     Attends meetings of clubs or organizations: Not on file     Relationship status: Not on file     Intimate partner violence     Fear of current or ex partner: Not on file     Emotionally abused: Not on file     Physically abused: Not on file     Forced sexual activity: Not on file   Other Topics Concern     Parent/sibling w/ CABG, MI or angioplasty before 65F 55M? No   Social History Narrative     Not on file       Video-Visit Details    Type of service:  Video Visit    Video Start Time: 1506  Video End Time:  1514  Duration 8 minutes    Originating Location (pt. Location): Home    Distant Location (provider location):  [facility]     Platform used for Video Visit: Jatinder Coleman PA-C

## 2020-10-05 ENCOUNTER — VIRTUAL VISIT (OUTPATIENT)
Dept: CARDIOLOGY | Facility: CLINIC | Age: 65
End: 2020-10-05
Attending: INTERNAL MEDICINE
Payer: COMMERCIAL

## 2020-10-05 DIAGNOSIS — I48.0 PAF (PAROXYSMAL ATRIAL FIBRILLATION) (H): ICD-10-CM

## 2020-10-05 DIAGNOSIS — Z86.79 HISTORY OF WOLFF-PARKINSON-WHITE (WPW) SYNDROME: ICD-10-CM

## 2020-10-05 DIAGNOSIS — R00.2 PALPITATIONS: ICD-10-CM

## 2020-10-05 DIAGNOSIS — Z11.59 ENCOUNTER FOR SCREENING FOR OTHER VIRAL DISEASES: ICD-10-CM

## 2020-10-05 DIAGNOSIS — I48.0 PAROXYSMAL ATRIAL FIBRILLATION (H): Primary | ICD-10-CM

## 2020-10-05 PROCEDURE — 99214 OFFICE O/P EST MOD 30 MIN: CPT | Mod: 95 | Performed by: PHYSICIAN ASSISTANT

## 2020-10-05 NOTE — PATIENT INSTRUCTIONS
Robert (and Ольга) -  It was nice to speak with you today!    1. Reviewed that you've had some palpitations lasting ~2 minutes since you stopped flecainide    PLAN:  1. As we discussed, will stop metoprolol Xl 12.5 mg daily per your request.  2. MONITOR for recurrent palpitations. If they come back and bother you, restart metoprolol XL 12.5 mg daily and CALL us. Dr. Altamirano may want to do testing and/or start you back on flecainide  3. CALL for palpitations lasting >2 hours  4. See primary doctor for repeat 6 m CT scan of chest due to nodule we saw on the CT scan before the ablation.    5. See Dr. Altamirano in 3 months but call if issues prior! [phone] (AFib nurses Tricia Bush and Lynnette)

## 2020-10-05 NOTE — LETTER
"10/5/2020    Physician No Ref-Primary  No address on file    RE: Robert Felix       Dear Colleague,    I had the pleasure of seeing Robert Felix in the [facility].    Robert Felix is a 65 year old male who is being evaluated via a billable video visit.      The patient has been notified of following:     \"This video visit will be conducted via a call between you and your physician/provider. We have found that certain health care needs can be provided without the need for an in-person physical exam.  This service lets us provide the care you need with a video conversation.  If a prescription is necessary we can send it directly to your pharmacy.  If lab work is needed we can place an order for that and you can then stop by our lab to have the test done at a later time.    Video visits are billed at different rates depending on your insurance coverage.  Please reach out to your insurance provider with any questions.    If during the course of the call the physician/provider feels a video visit is not appropriate, you will not be charged for this service.\"    Patient has given verbal consent for Video visit? Yes  How would you like to obtain your AVS? Mail a copy  If you are dropped from the video visit, the video invite should be resent to: Text to cell phone: [phone]  Will anyone else be joining your video visit? No       Vitals - Patient Reported  Weight (Patient Reported): 81.2 kg (179 lb)  Height (Patient Reported): 182.9 cm (6')  BMI (Based on Pt Reported Ht/Wt): 24.28      Review Of Systems  Skin: NEGATIVE  Eyes:Ears/Nose/Throat: NEGATIVE  Respiratory: NEGATIVE  Cardiovascular: Occasional discomfort in chest, not related to activity, palpitations  Gastrointestinal: NEGATIVE  Genitourinary:NEGATIVE  Musculoskeletal: NEGATIVE  Neurologic: NEGATIVE  Psychiatric: NEGATIVE  Hematologic/Lymphatic/Immunologic: NEGATIVE  Endocrine:  NEGATIVE    Elayne " YOLA    CC:  Atrial Fibrillation    VITALS:  179#    BRIEF HPI:  Robert is a 65 year old yo M who sees Dr. Altamirano for h/o:    1. Paroxysmal AFib - started on flecainide 2017, stopped in 12/2019 for unknown reason but restarted and later increased. Recurrent AFib, now s/p ablation (PVI, CTI) 5/13/2020  2. WPW - s/p ablation 1992  3. Bicuspid AV with mild root enlargement (4 cm) on echo 6/2019   4. Pulmonary Nodule - noted on CTA 5/2020 prior to AFib ablation. 6 m and 24 m CT rec'd if low risk; CT @ 3,9 and 24 m vs PET/Bx if high risk     Dr. Altamirano saw Robert 9/2020 at which time he was 4 m post PVI/CTI ablation. He was doing quite a bit better. Follow-up ZP 8/2020 showed 1 4-beat run of SVT. No AFib noted. Flecainide was stopped. Xarelto was stopped and metoprolol XL 12.5 mg daily was continued. 1 m follow-up rec'd to see if metoprolol could be stopped. PCP follow-up or pulmonary nodule rec'd.    INTERVAL HISTORY:  Notes that he's had some episodes of palpitations since stopping the flecainide, somewhat similar to what he had before the ablation. Longest episode was ~2 minutes.    No complaints of chest pain, pressure or tightness.  No orthopnea, PND or edema. No change in exercise tolerance or breathing. Overall, he is feeling well. He would like to see how he does off of low-dose metoprolol    DIAGNOSTICS:  EKG 8/21/2020 SR 78 bpm. QRS duration 98 ms on flecainide 75mg BID  ZioPatch 8/2020 with NSR. No AFib. 1 4 beat run of SVT noted.  JOSELITO 5/2020 EF 55-60%. Nl RV. Nl LA size. PETER unusually small/shallow. Biscuspid AV with complete LCC/RCC raphe. Mild aortic root dilatation.  CTA Heart 5/2020 with mild dilated aortic lobe 4cm. 7 mm nodule in R anterior lung base.    REVIEW OF SYSTEMS:  Negative except as noted above     PHYSICAL EXAM:  GEN: NAD. Upright. Normal body habitus.  today is daughter Ольга  ENT/Mouth: Atraumatic and normocephalic  Chest/Lungs: No audible wheezing or cough; equal chest wall  expansion. Non-labored breathing  Skin: No visible rashes. Normal skin color  Psychiatric: A/O. Calm demeanor      ASSESSMENT/PLAN:    1. Paroxysmal AFib    Had been having daily episodes despite flecainide 75 mg BID prior to CTI/PVI 5/2020. Noted almost no breakthrough when he spoke with Dr. Altamirano 9/2020    Off of flecainide 75 mg BID, he's had a few episodes of palpitations, nothing over 2 minutes    CHADSVASc 0. Xarelto 20 mg daily stopped 9/2/2020 at Dr. Altamirano's appt     PLAN:    Discussed continued metoprolol given that he's had some palpitations but he wants to see how he does off of it.     STOP metoprolol XL 12.5 mg daily    Call if palpitations worsen - can add back metoprolol or even flecainide (maybe a lower dose as now s/p ablation)    2. Bicuspid AoV, mild aortic dilatation    JOSELITO 5/2020 with mild aortic root dilatation     PLAN:    2 y echo    3. Pulmonary Nodule    Follow-up rec'd     PLAN:    Reviewed with daughter Ольга. She was not aware       CURRENT MEDICATIONS:  Current Outpatient Medications   Medication Sig Dispense Refill     clobetasol (TEMOVATE) 0.05 % cream APPLY 1 APPLICATION TO SKIN ONCE A DAY AS NEEDED (HAND DERMATITIS).  3         ORDERS PLACED:  Orders Placed This Encounter   Procedures     Follow-Up with Electrophysiologist     EKG 12-lead complete w/read - Clinics     No orders of the defined types were placed in this encounter.    Medications Discontinued During This Encounter   Medication Reason     flecainide (TAMBOCOR) 50 MG tablet Discontinued by another Health Care Provider     rivaroxaban ANTICOAGULANT (XARELTO) 20 MG TABS tablet Therapy completed     metoprolol succinate ER (TOPROL-XL) 25 MG 24 hr tablet          Encounter Diagnoses   Name Primary?     Paroxysmal atrial fibrillation (H) Yes     History of Brian-Parkinson-White (WPW) syndrome      Palpitations      Encounter for screening for other viral diseases      PAF (paroxysmal atrial fibrillation) (H)           ALLERGIES     Allergies   Allergen Reactions     Aspirin Rash     States this happened 30 years ago and he has not taken any since       PAST MEDICAL HISTORY:  Past Medical History:   Diagnosis Date     Paroxysmal atrial fibrillation (H)      WPW (Brian-Parkinson-White syndrome)        PAST SURGICAL HISTORY:  Past Surgical History:   Procedure Laterality Date     EP ABLATION FOCAL AFIB N/A 5/13/2020    Procedure: EP Ablation Focal AFIB;  Surgeon: Javier Altamirano MD;  Location:  [facility]     EP WPW ABLATION  1992       FAMILY HISTORY:  Family History   Problem Relation Age of Onset     Cancer Mother      Angina Father        SOCIAL HISTORY:  Social History     Socioeconomic History     Marital status:      Spouse name: Not on file     Number of children: Not on file     Years of education: Not on file     Highest education level: Not on file   Occupational History     Not on file   Social Needs     Financial resource strain: Not on file     Food insecurity     Worry: Not on file     Inability: Not on file     Transportation needs     Medical: Not on file     Non-medical: Not on file   Tobacco Use     Smoking status: Never Smoker     Smokeless tobacco: Never Used   Substance and Sexual Activity     Alcohol use: Not on file     Drug use: No     Sexual activity: Not on file   Lifestyle     Physical activity     Days per week: Not on file     Minutes per session: Not on file     Stress: Not on file   Relationships     Social connections     Talks on phone: Not on file     Gets together: Not on file     Attends Roman Catholic service: Not on file     Active member of club or organization: Not on file     Attends meetings of clubs or organizations: Not on file     Relationship status: Not on file     Intimate partner violence     Fear of current or ex partner: Not on file     Emotionally abused: Not on file     Physically abused: Not on file     Forced sexual activity: Not on file   Other  Topics Concern     Parent/sibling w/ CABG, MI or angioplasty before 65F 55M? No   Social History Narrative     Not on file       Video-Visit Details    Type of service:  Video Visit    Video Start Time: 1506  Video End Time: 1514  Duration 8 minutes    Originating Location (pt. Location): Home    Distant Location (provider location):  [facility]     Platform used for Video Visit: Jatinder Coleman PA-C            Thank you for allowing me to participate in the care of your patient.      Sincerely,     Cassy Coleman PA-C     [facility]    cc:   Javier Altamirano MD  [address]  [place] [address]

## 2021-08-14 ENCOUNTER — HEALTH MAINTENANCE LETTER (OUTPATIENT)
Age: 66
End: 2021-08-14

## 2021-10-09 ENCOUNTER — HEALTH MAINTENANCE LETTER (OUTPATIENT)
Age: 66
End: 2021-10-09

## 2022-09-11 ENCOUNTER — HEALTH MAINTENANCE LETTER (OUTPATIENT)
Age: 67
End: 2022-09-11

## 2023-01-30 ENCOUNTER — TELEPHONE (OUTPATIENT)
Dept: CARDIOLOGY CLINIC | Age: 68
End: 2023-01-30

## 2023-01-30 NOTE — TELEPHONE ENCOUNTER
Pt.having SOB/palpitations and irreg.heart beat. Sometimes HR spikes to 130-150's. Last saw Soniya Tamayo in 2021.   Appt.made tomorrow w//[facility]

## 2023-10-30 NOTE — Clinical Note
gauze and transparent dressing applied to wound securely.  Wartpeel Counseling:  I discussed with the patient the risks of Wartpeel including but not limited to erythema, scaling, itching, weeping, crusting, and pain.

## (undated) DEVICE — RAD INTRODUCER KIT MICRO 5FRX10CM .018 NITINOL G/W

## (undated) DEVICE — INTRODUCER SHEATH FAST-CATH SWARTZ 8.5FRX63CM SL1 CVD 406849

## (undated) DEVICE — DEFIB PRO-PADZ LVP LQD GEL ADULT 8900-2105-01

## (undated) DEVICE — PACK EP SRG PROC LF DISP SAN32EPFSR

## (undated) DEVICE — PATCH CARTO 3 EXTERNAL REFERENCE 3D MAPPING CREFP6

## (undated) DEVICE — INTRO SHEATH 9FRX10CM PINNACLE RSS902

## (undated) DEVICE — CATH THERMOCOOL SMARTTOUCH SF FJ CURVE

## (undated) DEVICE — NDL TRNSEPT 18GA X 71CM 86 DEG

## (undated) DEVICE — CATH NAV PENTARAY F CURVE

## (undated) DEVICE — GUIDEWIRE TRNSEPT 0.014 X35CM SAFE SE

## (undated) DEVICE — CATH EP 110CM 7FR HALO XP 2-1

## (undated) DEVICE — INTRODUCER SHEATH GREEN 6.5FRX11CM .038IN PSI-6F-11-038ACT

## (undated) DEVICE — CATH EP 6FR 2MM TIP 2-8-2 115C

## (undated) DEVICE — CATH SOUNDSTAR 8FRX90CM 10439011

## (undated) DEVICE — TUBE SET SMARKABLATE IRRIGATION

## (undated) RX ORDER — FENTANYL CITRATE 50 UG/ML
INJECTION, SOLUTION INTRAMUSCULAR; INTRAVENOUS
Status: DISPENSED
Start: 2020-05-11

## (undated) RX ORDER — LIDOCAINE HYDROCHLORIDE 40 MG/ML
SOLUTION TOPICAL
Status: DISPENSED
Start: 2020-05-11

## (undated) RX ORDER — FENTANYL CITRATE 50 UG/ML
INJECTION, SOLUTION INTRAMUSCULAR; INTRAVENOUS
Status: DISPENSED
Start: 2020-05-13

## (undated) RX ORDER — HEPARIN SODIUM 1000 [USP'U]/ML
INJECTION, SOLUTION INTRAVENOUS; SUBCUTANEOUS
Status: DISPENSED
Start: 2020-05-13

## (undated) RX ORDER — LIDOCAINE HYDROCHLORIDE 10 MG/ML
INJECTION, SOLUTION EPIDURAL; INFILTRATION; INTRACAUDAL; PERINEURAL
Status: DISPENSED
Start: 2020-05-13

## (undated) RX ORDER — FLUMAZENIL 0.1 MG/ML
INJECTION, SOLUTION INTRAVENOUS
Status: DISPENSED
Start: 2020-05-11

## (undated) RX ORDER — GLYCOPYRROLATE 0.2 MG/ML
INJECTION, SOLUTION INTRAMUSCULAR; INTRAVENOUS
Status: DISPENSED
Start: 2020-05-11

## (undated) RX ORDER — NALOXONE HYDROCHLORIDE 0.4 MG/ML
INJECTION, SOLUTION INTRAMUSCULAR; INTRAVENOUS; SUBCUTANEOUS
Status: DISPENSED
Start: 2020-05-11

## (undated) RX ORDER — HEPARIN SODIUM 200 [USP'U]/100ML
INJECTION, SOLUTION INTRAVENOUS
Status: DISPENSED
Start: 2020-05-13